# Patient Record
Sex: MALE | Race: WHITE | NOT HISPANIC OR LATINO | Employment: FULL TIME | URBAN - METROPOLITAN AREA
[De-identification: names, ages, dates, MRNs, and addresses within clinical notes are randomized per-mention and may not be internally consistent; named-entity substitution may affect disease eponyms.]

---

## 2017-07-28 ENCOUNTER — ALLSCRIPTS OFFICE VISIT (OUTPATIENT)
Dept: OTHER | Facility: OTHER | Age: 47
End: 2017-07-28

## 2017-07-28 DIAGNOSIS — Z00.00 ENCOUNTER FOR GENERAL ADULT MEDICAL EXAMINATION WITHOUT ABNORMAL FINDINGS: ICD-10-CM

## 2017-07-28 LAB
BILIRUB UR QL STRIP: NORMAL
CLARITY UR: NORMAL
COLOR UR: YELLOW
GLUCOSE (HISTORICAL): NORMAL
HGB UR QL STRIP.AUTO: NORMAL
KETONES UR STRIP-MCNC: NORMAL MG/DL
LEUKOCYTE ESTERASE UR QL STRIP: NORMAL
NITRITE UR QL STRIP: NORMAL
PH UR STRIP.AUTO: 6 [PH]
PROT UR STRIP-MCNC: NORMAL MG/DL
SP GR UR STRIP.AUTO: 1.01
UROBILINOGEN UR QL STRIP.AUTO: 0.2

## 2017-08-04 ENCOUNTER — GENERIC CONVERSION - ENCOUNTER (OUTPATIENT)
Dept: OTHER | Facility: OTHER | Age: 47
End: 2017-08-04

## 2017-08-04 ENCOUNTER — LAB CONVERSION - ENCOUNTER (OUTPATIENT)
Dept: OTHER | Facility: OTHER | Age: 47
End: 2017-08-04

## 2017-08-04 LAB
A/G RATIO (HISTORICAL): 1.6 (CALC) (ref 1–2.5)
ALBUMIN SERPL BCP-MCNC: 4.2 G/DL (ref 3.6–5.1)
ALP SERPL-CCNC: 66 U/L (ref 40–115)
ALT SERPL W P-5'-P-CCNC: 34 U/L (ref 9–46)
AST SERPL W P-5'-P-CCNC: 25 U/L (ref 10–40)
BASOPHILS # BLD AUTO: 0.8 %
BASOPHILS # BLD AUTO: 52 CELLS/UL (ref 0–200)
BILIRUB SERPL-MCNC: 0.7 MG/DL (ref 0.2–1.2)
BUN SERPL-MCNC: 21 MG/DL (ref 7–25)
BUN/CREA RATIO (HISTORICAL): NORMAL (CALC) (ref 6–22)
CALCIUM SERPL-MCNC: 9.3 MG/DL (ref 8.6–10.3)
CHLORIDE SERPL-SCNC: 104 MMOL/L (ref 98–110)
CHOLEST SERPL-MCNC: 186 MG/DL (ref 125–200)
CHOLEST/HDLC SERPL: 4.7 (CALC)
CO2 SERPL-SCNC: 26 MMOL/L (ref 20–31)
CREAT SERPL-MCNC: 1.09 MG/DL (ref 0.6–1.35)
DEPRECATED RDW RBC AUTO: 12.2 % (ref 11–15)
EGFR AFRICAN AMERICAN (HISTORICAL): 92 ML/MIN/1.73M2
EGFR-AMERICAN CALC (HISTORICAL): 79 ML/MIN/1.73M2
EOSINOPHIL # BLD AUTO: 182 CELLS/UL (ref 15–500)
EOSINOPHIL # BLD AUTO: 2.8 %
GAMMA GLOBULIN (HISTORICAL): 2.7 G/DL (CALC) (ref 1.9–3.7)
GLUCOSE (HISTORICAL): 94 MG/DL (ref 65–99)
HCT VFR BLD AUTO: 46.2 % (ref 38.5–50)
HDLC SERPL-MCNC: 40 MG/DL
HGB BLD-MCNC: 15.3 G/DL (ref 13.2–17.1)
LDL CHOLESTEROL (HISTORICAL): 97 MG/DL (CALC)
LYMPHOCYTES # BLD AUTO: 2048 CELLS/UL (ref 850–3900)
LYMPHOCYTES # BLD AUTO: 31.5 %
MCH RBC QN AUTO: 29.8 PG (ref 27–33)
MCHC RBC AUTO-ENTMCNC: 33.1 G/DL (ref 32–36)
MCV RBC AUTO: 90.1 FL (ref 80–100)
MONOCYTES # BLD AUTO: 572 CELLS/UL (ref 200–950)
MONOCYTES (HISTORICAL): 8.8 %
NEUTROPHILS # BLD AUTO: 3647 CELLS/UL (ref 1500–7800)
NEUTROPHILS # BLD AUTO: 56.1 %
NON-HDL-CHOL (CHOL-HDL) (HISTORICAL): 146 MG/DL (CALC)
PLATELET # BLD AUTO: 192 THOUSAND/UL (ref 140–400)
PMV BLD AUTO: 10.2 FL (ref 7.5–12.5)
POTASSIUM SERPL-SCNC: 4.2 MMOL/L (ref 3.5–5.3)
RBC # BLD AUTO: 5.13 MILLION/UL (ref 4.2–5.8)
SODIUM SERPL-SCNC: 139 MMOL/L (ref 135–146)
TOTAL PROTEIN (HISTORICAL): 6.9 G/DL (ref 6.1–8.1)
TRIGL SERPL-MCNC: 243 MG/DL
TSH SERPL DL<=0.05 MIU/L-ACNC: 2.79 MIU/L (ref 0.4–4.5)
WBC # BLD AUTO: 6.5 THOUSAND/UL (ref 3.8–10.8)

## 2018-01-09 NOTE — PROGRESS NOTES
Assessment    1  Encounter for preventive health examination (V70 0) (Z00 00)   2  Headache (784 0) (R51)    Plan  Headache    · Montelukast Sodium 10 MG Oral Tablet; TAKE ONE (1) TABLET(S) DAILY   · * CT HEAD WO CONTRAST; Status:Need Information - Financial Authorization; Requested for:[id number]; Health Maintenance    · (1) CBC/PLT/DIFF; Status:Active; Requested for:28Jul2017;    · (1) COMPREHENSIVE METABOLIC PANEL; Status:Active; Requested for:28Jul2017;    · (1) LIPID PANEL, FASTING; Status:Active; Requested for:28Jul2017;    · (1) TSH; Status:Active; Requested for:28Jul2017;    · Urine Dip Non-Automated- POC; Status:Complete;   Done: [id number] 07:42AM    Discussion/Summary    Well adult  Patient appears to be in stable health  He will obtain blood work for further evaluation  Headaches  Patient will follow up with his eye doctor to check his visual acuity  If headaches continue he will make an appointment to have a CAT scan of his head performed as discussed    Allergic rhinitis  Patient given prescription for a trial of Singulair 10 mg one daily  If symptoms persist patient will call the office and we will consider ENT referral       Chief Complaint  Pt is here for a well exam  Pt states that for the past couple of months he has noticed an increase in headaches  All meds/allergies reviewed with pt  History of Present Illness  HPI: I reviewed chief complaint with the patient  He describes having allergy type symptoms with nasal congestion  He is tried over-the-counter Flonase, Claritin, Allegra without relief in symptoms  He also describes headaches are sometimes increase when he is bearing down to lift weights  He is scheduled see his eye doctor next week for routine exam      Review of Systems    Constitutional: No fever or chills, feels well, no tiredness, no recent weight gain or weight loss  Eyes: No complaints of eye pain, no red eyes, no discharge from eyes, no itchy eyes     ENT: no complaints of earache, no hearing loss, no nosebleeds, no nasal discharge, no sore throat, no hoarseness  Cardiovascular: No complaints of slow heart rate, no fast heart rate, no chest pain, no palpitations, no leg claudication, no lower extremity  Respiratory: No complaints of shortness of breath, no wheezing, no cough, no SOB on exertion, no orthopnea or PND  Gastrointestinal: No complaints of abdominal pain, no constipation, no nausea or vomiting, no diarrhea or bloody stools  Genitourinary: No complaints of dysuria, no incontinence, no hesitancy, no nocturia, no genital lesion, no testicular pain  Musculoskeletal: No complaints of arthralgia, no myalgias, no joint swelling or stiffness, no limb pain or swelling  Integumentary: No complaints of skin rash or skin lesions, no itching, no skin wound, no dry skin  Neurological: headache  Active Problems    1  Rectal bleed (569 3) (K62 5)   2  Upper respiratory infection (465 9) (J06 9)    Family History  Mother    · Family history of hypercholesterolemia (V18 19) (Z80 44)  Father    · Family history of borderline diabetes mellitus (V18 0) (Z84 89)   · Family history of chronic obstructive pulmonary disease (V17 6) (Z82 5)   · Family history of hypercholesterolemia (V18 19) (Z83 42)   · Family history of hypertension (V17 49) (Z82 49)    Social History    · Never a smoker    Allergies    1  No Known Drug Allergies    Vitals   Recorded: 57Zqz1639 07:50AM Recorded: 56Yfj1151 07:40AM   Temperature  96 2 F   Heart Rate  80   Systolic 528 557   Diastolic 78 80   Height  5 ft 6 7 in   Weight  198 lb 2 oz   BMI Calculated  31 31   BSA Calculated  2 01     Physical Exam    Constitutional   General appearance: No acute distress, well appearing and well nourished  Ears, Nose, Mouth, and Throat   External inspection of ears and nose: Normal     Otoscopic examination: Tympanic membranes translucent with normal light reflex  Canals patent without erythema  Hearing: Normal     Oropharynx: Normal with no erythema, edema, exudate or lesions  Pulmonary   Respiratory effort: No increased work of breathing or signs of respiratory distress  Auscultation of lungs: Clear to auscultation  Cardiovascular   Auscultation of heart: Normal rate and rhythm, normal S1 and S2, no murmurs  Carotid pulses: 2+ bilaterally  Examination of extremities for edema and/or varicosities: Normal     Abdomen   Abdomen: Non-tender, no masses  Liver and spleen: No hepatomegaly or splenomegaly  Lymphatic   Palpation of lymph nodes in neck: No lymphadenopathy      Musculoskeletal   Gait and station: Normal        Results/Data  Urine Dip Non-Automated- POC [id number] 89:01KA Rambo Herrera     Test Name Result Flag Reference   Color Yellow     Clarity Transparent     Leukocytes -     Nitrite -     Blood -     Bilirubin -     Urobilinogen 0 2     Protein -     Ph 6 0     Specific Gravity 1 015     Ketone -     Glucose -         Signatures   Electronically signed by : JASON Chambers ; Jul 28 4736  9:56AM EST                       (Author)

## 2018-01-10 NOTE — RESULT NOTES
Verified Results  (1) COMPREHENSIVE METABOLIC PANEL [id number] 81:37SY Daja Cruz     Test Name Result Flag Reference   GLUCOSE 98 mg/dL  65-99   Fasting reference interval   UREA NITROGEN (BUN) 18 mg/dL  7-25   CREATININE 1 03 mg/dL  0 60-1 35   eGFR NON-AFR  AMERICAN 86 mL/min/1 73m2  > OR = 60   eGFR AFRICAN AMERICAN 100 mL/min/1 73m2  > OR = 60   BUN/CREATININE RATIO   4-16   NOT APPLICABLE (calc)   SODIUM 139 mmol/L  135-146   POTASSIUM 4 0 mmol/L  3 5-5 3   CHLORIDE 105 mmol/L     CARBON DIOXIDE 26 mmol/L  19-30   CALCIUM 8 8 mg/dL  8 6-10 3   PROTEIN, TOTAL 6 4 g/dL  6 1-8 1   ALBUMIN 4 0 g/dL  3 6-5 1   GLOBULIN 2 4 g/dL (calc)  1 9-3 7   ALBUMIN/GLOBULIN RATIO 1 7 (calc)  1 0-2 5   BILIRUBIN, TOTAL 1 0 mg/dL  0 2-1 2   ALKALINE PHOSPHATASE 60 U/L     AST 19 U/L  10-40   ALT 23 U/L  9-46     (1) LIPID PANEL, FASTING [id number] 93:80EB Angel Luis Mota     Test Name Result Flag Reference   CHOLESTEROL, TOTAL 165 mg/dL  125-200   HDL CHOLESTEROL 39 mg/dL L > OR = 40   TRIGLICERIDES 718 mg/dL  <150   LDL-CHOLESTEROL 101 mg/dL (calc)  <130   Desirable range <100 mg/dL for patients with CHD or  diabetes and <70 mg/dL for diabetic patients with  known heart disease  CHOL/HDLC RATIO 4 2 (calc)  < OR = 5 0   NON HDL CHOLESTEROL 126 mg/dL (calc)     Target for non-HDL cholesterol is 30 mg/dL higher than   LDL cholesterol target       (Q) TSH, 3RD GENERATION [id number] 05:84HO Angel Luis Valladares   REPORT COMMENT:  FASTING:YES     Test Name Result Flag Reference   TSH 2 41 mIU/L  0 40-4 50       Discussion/Summary   all bw looked ok for him

## 2018-01-14 VITALS
HEIGHT: 67 IN | WEIGHT: 198.13 LBS | TEMPERATURE: 96.2 F | BODY MASS INDEX: 31.1 KG/M2 | HEART RATE: 80 BPM | SYSTOLIC BLOOD PRESSURE: 120 MMHG | DIASTOLIC BLOOD PRESSURE: 78 MMHG

## 2018-01-15 NOTE — PROGRESS NOTES
Assessment    1  Encounter for preventive health examination (V70 0) (Z00 00)   2  Rectal bleed (569 3) (K62 5)    Plan  Health Maintenance    · Urine Dip Non-Automated- POC; Status:Resulted - Requires Verification;   Done:  48MAB2858 08:44AM  Problems    · (1) COMPREHENSIVE METABOLIC PANEL; Status:Active; Requested [id number]:[id number];    · (1) LIPID PANEL, FASTING; Status:Active; Requested FVD:[id number];    · (1) TSH; Status:Active; Requested [id number];   Rectal bleed    · COLONOSCOPY; Status:Active; Requested OSB:[id number];     Discussion/Summary    Well adult  Patient appears to be in stable health  He will obtain blood work as ordered  He was encouraged to have a regular form of exercise titrating up to 40 minutes 4 days a week of an aerobic activity  Rectal bleed  Patient was referred for colonoscopy for further evaluation  Chief Complaint  Pt is here for an Annual Physical  No meds  No allergies  No recent BW  History of Present Illness  HPI: Patient denies any recent illness  He states he has not been able to exercise as regularly due to his busy work  He has noticed over the past several months changes in bowel movements and occasiona right red blood with bowel movements  Denies any pains  Review of Systems    Constitutional: No fever or chills, feels well, no tiredness, no recent weight gain or weight loss  Eyes: No complaints of eye pain, no red eyes, no discharge from eyes, no itchy eyes  ENT: no complaints of earache, no hearing loss, no nosebleeds, no nasal discharge, no sore throat, no hoarseness  Cardiovascular: No complaints of slow heart rate, no fast heart rate, no chest pain, no palpitations, no leg claudication, no lower extremity  Respiratory: No complaints of shortness of breath, no wheezing, no cough, no SOB on exertion, no orthopnea or PND  Gastrointestinal: as noted in HPI     Genitourinary: No complaints of dysuria, no incontinence, no hesitancy, no nocturia, no genital lesion, no testicular pain  Musculoskeletal: No complaints of arthralgia, no myalgias, no joint swelling or stiffness, no limb pain or swelling  Integumentary: No complaints of skin rash or skin lesions, no itching, no skin wound, no dry skin  Neurological: No compliants of headache, no confusion, no convulsions, no numbness or tingling, no dizziness or fainting, no limb weakness, no difficulty walking  Vitals   Recorded: 16Jun2016 08:48AM Recorded: 46OAD0859 08:36AM   Temperature  96 5 F   Heart Rate  78   Respiration  16   Systolic 329 880   Diastolic 90 82   Height  5 ft 6 7 in   Weight  197 lb 8 0 oz   BMI Calculated  31 21   BSA Calculated  2     Physical Exam    Constitutional   General appearance: No acute distress, well appearing and well nourished  Ears, Nose, Mouth, and Throat   External inspection of ears and nose: Normal     Otoscopic examination: Tympanic membranes translucent with normal light reflex  Canals patent without erythema  Hearing: Normal     Oropharynx: Normal with no erythema, edema, exudate or lesions  Pulmonary   Respiratory effort: No increased work of breathing or signs of respiratory distress  Auscultation of lungs: Clear to auscultation  Cardiovascular   Auscultation of heart: Normal rate and rhythm, normal S1 and S2, no murmurs  Carotid pulses: 2+ bilaterally  Examination of extremities for edema and/or varicosities: Normal     Abdomen   Abdomen: Non-tender, no masses  Liver and spleen: No hepatomegaly or splenomegaly  Lymphatic   Palpation of lymph nodes in neck: No lymphadenopathy      Musculoskeletal   Gait and station: Normal        Signatures   Electronically signed by : JASON Curiel ; Jun 16 2341  9:01AM EST                       (Author)

## 2018-01-16 NOTE — RESULT NOTES
Discussion/Summary   all bw stable for him     Verified Results  (1) CBC/PLT/DIFF [id number] 22:80HJ Yessy Guzman     Test Name Result Flag Reference   WHITE BLOOD CELL COUNT 6 5 Thousand/uL  3 8-10 8   RED BLOOD CELL COUNT 5 13 Million/uL  4 20-5 80   HEMOGLOBIN 15 3 g/dL  13 2-17 1   HEMATOCRIT 46 2 %  38 5-50 0   MCV 90 1 fL  80 0-100 0   MCH 29 8 pg  27 0-33 0   MCHC 33 1 g/dL  32 0-36 0   RDW 12 2 %  11 0-15 0   PLATELET COUNT 747 Thousand/uL  140-400   ABSOLUTE NEUTROPHILS 3647 cells/uL  3176-4787   ABSOLUTE LYMPHOCYTES 2048 cells/uL  850-3900   ABSOLUTE MONOCYTES 572 cells/uL  200-950   ABSOLUTE EOSINOPHILS 182 cells/uL     ABSOLUTE BASOPHILS 52 cells/uL  0-200   NEUTROPHILS 56 1 %     LYMPHOCYTES 31 5 %     MONOCYTES 8 8 %     EOSINOPHILS 2 8 %     BASOPHILS 0 8 %     MPV 10 2 fL  7 5-12 5     (1) COMPREHENSIVE METABOLIC PANEL [id number] 26:07RV Yessy Guzman     Test Name Result Flag Reference   GLUCOSE 94 mg/dL  65-99   Fasting reference interval   UREA NITROGEN (BUN) 21 mg/dL  7-25   CREATININE 1 09 mg/dL  0 60-1 35   eGFR NON-AFR   AMERICAN 79 mL/min/1 73m2  > OR = 60   eGFR AFRICAN AMERICAN 92 mL/min/1 73m2  > OR = 60   BUN/CREATININE RATIO   7-91   NOT APPLICABLE (calc)   SODIUM 139 mmol/L  135-146   POTASSIUM 4 2 mmol/L  3 5-5 3   CHLORIDE 104 mmol/L     CARBON DIOXIDE 26 mmol/L  20-31   CALCIUM 9 3 mg/dL  8 6-10 3   PROTEIN, TOTAL 6 9 g/dL  6 1-8 1   ALBUMIN 4 2 g/dL  3 6-5 1   GLOBULIN 2 7 g/dL (calc)  1 9-3 7   ALBUMIN/GLOBULIN RATIO 1 6 (calc)  1 0-2 5   BILIRUBIN, TOTAL 0 7 mg/dL  0 2-1 2   ALKALINE PHOSPHATASE 66 U/L     AST 25 U/L  10-40   ALT 34 U/L  9-46     (1) LIPID PANEL, FASTING [id number] 00:30GF Angel Luis Morrison     Test Name Result Flag Reference   CHOLESTEROL, TOTAL 186 mg/dL  125-200   HDL CHOLESTEROL 40 mg/dL  > OR = 40   TRIGLICERIDES 675 mg/dL H <150   LDL-CHOLESTEROL 97 mg/dL (calc)  <130   Desirable range <100 mg/dL for patients with CHD or  diabetes and <70 mg/dL for diabetic patients with  known heart disease  CHOL/HDLC RATIO 4 7 (calc)  < OR = 5 0   NON HDL CHOLESTEROL 146 mg/dL (calc)     Target for non-HDL cholesterol is 30 mg/dL higher than   LDL cholesterol target       (Q) TSH, 3RD GENERATION [id number] 11:60XJ Socorro Sy   REPORT COMMENT:  FASTING:YES     Test Name Result Flag Reference   TSH 2 79 mIU/L  0 40-4 50

## 2018-08-10 DIAGNOSIS — J45.909 UNCOMPLICATED ASTHMA, UNSPECIFIED ASTHMA SEVERITY, UNSPECIFIED WHETHER PERSISTENT: Primary | ICD-10-CM

## 2018-08-10 RX ORDER — MONTELUKAST SODIUM 10 MG/1
TABLET ORAL
Qty: 30 TABLET | Refills: 5 | Status: SHIPPED | OUTPATIENT
Start: 2018-08-10 | End: 2019-02-09 | Stop reason: SDUPTHER

## 2018-12-14 ENCOUNTER — OFFICE VISIT (OUTPATIENT)
Dept: FAMILY MEDICINE CLINIC | Facility: CLINIC | Age: 48
End: 2018-12-14
Payer: COMMERCIAL

## 2018-12-14 VITALS
SYSTOLIC BLOOD PRESSURE: 112 MMHG | TEMPERATURE: 97 F | WEIGHT: 182.8 LBS | HEIGHT: 67 IN | BODY MASS INDEX: 28.69 KG/M2 | DIASTOLIC BLOOD PRESSURE: 82 MMHG | RESPIRATION RATE: 14 BRPM | HEART RATE: 64 BPM

## 2018-12-14 DIAGNOSIS — J30.9 ALLERGIC RHINITIS, UNSPECIFIED SEASONALITY, UNSPECIFIED TRIGGER: ICD-10-CM

## 2018-12-14 DIAGNOSIS — R35.1 NOCTURIA: ICD-10-CM

## 2018-12-14 DIAGNOSIS — Z00.00 ENCOUNTER FOR ANNUAL PHYSICAL EXAM: Primary | ICD-10-CM

## 2018-12-14 LAB
SL AMB  POCT GLUCOSE, UA: NEGATIVE
SL AMB LEUKOCYTE ESTERASE,UA: NEGATIVE
SL AMB POCT BILIRUBIN,UA: NEGATIVE
SL AMB POCT BLOOD,UA: NEGATIVE
SL AMB POCT CLARITY,UA: CLEAR
SL AMB POCT COLOR,UA: YELLOW
SL AMB POCT KETONES,UA: NEGATIVE
SL AMB POCT NITRITE,UA: NEGATIVE
SL AMB POCT PH,UA: 5
SL AMB POCT SPECIFIC GRAVITY,UA: 1.01
SL AMB POCT URINE PROTEIN: NEGATIVE
SL AMB POCT UROBILINOGEN: 0.2

## 2018-12-14 PROCEDURE — 99396 PREV VISIT EST AGE 40-64: CPT | Performed by: FAMILY MEDICINE

## 2018-12-14 PROCEDURE — 81002 URINALYSIS NONAUTO W/O SCOPE: CPT | Performed by: FAMILY MEDICINE

## 2018-12-14 RX ORDER — FLUTICASONE PROPIONATE 50 MCG
1 SPRAY, SUSPENSION (ML) NASAL DAILY
Qty: 16 G | Refills: 5 | Status: SHIPPED | OUTPATIENT
Start: 2018-12-14 | End: 2019-07-03 | Stop reason: ALTCHOICE

## 2018-12-14 NOTE — PROGRESS NOTES
FAMILY PRACTICE OFFICE VISIT       NAME: Tray Shah  AGE: 46 y o  SEX: male       : 1970        MRN: [id number]    DATE: 2018  TIME: 3:58 PM    Assessment and Plan     Problem List Items Addressed This Visit     Encounter for annual physical exam - Primary    Relevant Orders    POCT urine dip (Completed)    TSH, 3rd generation    CBC    Comprehensive metabolic panel    Lipid panel    Nocturia    Relevant Orders    PSA, Total Screen    Allergic rhinitis    Relevant Medications    fluticasone (FLONASE) 50 mcg/act nasal spray        Overall patient appears to be in stable health  He will obtain blood work as ordered for further evaluation  He was given referral to have screening colonoscopy with Dr Araceli Morgan  He was given prescription for Flonase nasal spray to use 2 sprays each nostril once daily in addition to his Singulair  If this does not significantly improve his breathing and his sleep quality he was given referral to [facility] for further evaluation  We will make further recommendations pending results of test     There are no Patient Instructions on file for this visit  Chief Complaint     Chief Complaint   Patient presents with    Physical Exam       History of Present Illness     Patient is here for annual wellness exam   He exercises 5 days a week  He has lost 20 lb in the last year with diet and exercise  He does describe noticing increased episodes of feeling excessive fatigue while driving an hour commute to in from work or even when speaking to his wife  Sleeps from 10:00 p m  Until 5:30 a m  He does describe restless sleep either by going to the bathroom or having episodes of not breathing easily to his nose despite the use of his Singulair on a daily basis  He does snore on a few occasions but his wife has not witnessed any apneic episodes  He is a nonsmoker    Patient refused annual flu vaccine        Review of Systems   Review of Systems Constitutional: Positive for fatigue  HENT:        As per HPI   Respiratory: Negative  Cardiovascular: Negative  Gastrointestinal: Negative  Genitourinary: Negative  Musculoskeletal: Negative  Skin: Negative  Neurological: Negative  Psychiatric/Behavioral: Positive for sleep disturbance  Active Problem List     Patient Active Problem List   Diagnosis    Encounter for annual physical exam    Nocturia    Allergic rhinitis       Past Medical History:  No past medical history on file  Past Surgical History:  No past surgical history on file  Family History:  Family History   Problem Relation Age of Onset    Hyperlipidemia Mother     Diabetes Father     COPD Father     Hyperlipidemia Father     Hypertension Father        Social History:  Social History     Social History    Marital status: Single     Spouse name: N/A    Number of children: N/A    Years of education: N/A     Occupational History    Not on file  Social History Main Topics    Smoking status: Never Smoker    Smokeless tobacco: Never Used    Alcohol use Yes      Comment: rare    Drug use: No    Sexual activity: Not on file     Other Topics Concern    Not on file     Social History Narrative    No narrative on file       Objective     Vitals:    12/14/18 1522   BP: 112/82   Pulse: 64   Resp: 14   Temp: (!) 97 °F (36 1 °C)     Wt Readings from Last 3 Encounters:   12/14/18 82 9 kg (182 lb 12 8 oz)   07/28/17 89 9 kg (198 lb 2 1 oz)   12/15/16 91 8 kg (202 lb 6 1 oz)       Physical Exam   Constitutional: He is oriented to person, place, and time  No distress  HENT:   Mouth/Throat: Oropharynx is clear and moist  No oropharyngeal exudate  Tympanic membranes within normal limits bilaterally    Increased mucosal swelling of nasal passages right greater than left   Neck:   No carotid bruit   Cardiovascular:   Regular rate and rhythm with no murmurs   Pulmonary/Chest:   Lungs are clear to auscultation without wheezes,rales, or rhonchi   Abdominal:   Abdomen is soft, nontender with positive bowel sounds  There is no rebound or guarding  No masses palpated   Musculoskeletal: He exhibits no edema  Lymphadenopathy:     He has no cervical adenopathy  Neurological: He is alert and oriented to person, place, and time  No cranial nerve deficit  Psychiatric: He has a normal mood and affect   His behavior is normal  Judgment and thought content normal        Pertinent Laboratory/Diagnostic Studies:  Lab Results   Component Value Date    BUN 21 08/03/2017    CREATININE 1 09 08/03/2017    CALCIUM 9 3 08/03/2017     08/03/2017    K 4 2 08/03/2017    CO2 26 08/03/2017     08/03/2017     Lab Results   Component Value Date    ALT 34 08/03/2017    AST 25 08/03/2017    ALKPHOS 66 08/03/2017    BILITOT 0 7 08/03/2017       Lab Results   Component Value Date    WBC 6 5 08/03/2017    HGB 15 3 08/03/2017    HCT 46 2 08/03/2017    MCV 90 1 08/03/2017     08/03/2017       No results found for: TSH    Lab Results   Component Value Date    CHOL 186 08/03/2017     Lab Results   Component Value Date    TRIG 243 (H) 08/03/2017     Lab Results   Component Value Date    HDL 40 08/03/2017     No results found for: LDLCALC  No results found for: HGBA1C    Results for orders placed or performed in visit on 12/14/18   POCT urine dip   Result Value Ref Range    LEUKOCYTE ESTERASE,UA negative     NITRITE,UA negative     SL AMB POCT UROBILINOGEN 0 2     POCT URINE PROTEIN negative      PH,UA 5 0     BLOOD,UA negative     SPECIFIC GRAVITY,UA 1 010     KETONES,UA negative     BILIRUBIN,UA negative     GLUCOSE, UA negative      COLOR,UA yellow     CLARITY,UA clear        Orders Placed This Encounter   Procedures    TSH, 3rd generation    PSA, Total Screen    CBC    Comprehensive metabolic panel    Lipid panel    POCT urine dip       ALLERGIES:  No Known Allergies    Current Medications     Current Outpatient Prescriptions Medication Sig Dispense Refill    montelukast (SINGULAIR) 10 mg tablet TAKE ONE (1) TABLET(S) DAILY 30 tablet 5    fluticasone (FLONASE) 50 mcg/act nasal spray 1 spray into each nostril daily 16 g 5     No current facility-administered medications for this visit  Health Maintenance     Health Maintenance   Topic Date Due    Depression Screening PHQ  1970    CRC Screening: Colonoscopy  1970    DTaP,Tdap,and Td Vaccines (1 - Tdap) 07/15/1989    INFLUENZA VACCINE  07/01/2018     There is no immunization history for the selected administration types on file for this patient      Manuel Gracia MD

## 2018-12-20 ENCOUNTER — TELEPHONE (OUTPATIENT)
Dept: FAMILY MEDICINE CLINIC | Facility: CLINIC | Age: 48
End: 2018-12-20

## 2018-12-20 LAB
ALBUMIN SERPL-MCNC: 4 G/DL (ref 3.6–5.1)
ALBUMIN/GLOB SERPL: 1.6 (CALC) (ref 1–2.5)
ALP SERPL-CCNC: 66 U/L (ref 40–115)
ALT SERPL-CCNC: 17 U/L (ref 9–46)
AST SERPL-CCNC: 18 U/L (ref 10–35)
BASOPHILS # BLD AUTO: 38 CELLS/UL (ref 0–200)
BASOPHILS NFR BLD AUTO: 0.7 %
BILIRUB SERPL-MCNC: 0.9 MG/DL (ref 0.2–1.2)
BUN SERPL-MCNC: 26 MG/DL (ref 7–25)
BUN/CREAT SERPL: 22 (CALC) (ref 6–22)
CALCIUM SERPL-MCNC: 8.3 MG/DL (ref 8.6–10.3)
CHLORIDE SERPL-SCNC: 104 MMOL/L (ref 98–110)
CHOLEST SERPL-MCNC: 184 MG/DL
CHOLEST/HDLC SERPL: 4 (CALC)
CO2 SERPL-SCNC: 30 MMOL/L (ref 20–32)
CREAT SERPL-MCNC: 1.17 MG/DL (ref 0.7–1.33)
EOSINOPHIL # BLD AUTO: 119 CELLS/UL (ref 15–500)
EOSINOPHIL NFR BLD AUTO: 2.2 %
ERYTHROCYTE [DISTWIDTH] IN BLOOD BY AUTOMATED COUNT: 11.8 % (ref 11–15)
GLOBULIN SER CALC-MCNC: 2.5 G/DL (CALC) (ref 1.9–3.7)
GLUCOSE SERPL-MCNC: 84 MG/DL (ref 65–99)
HCT VFR BLD AUTO: 45 % (ref 38.5–50)
HDLC SERPL-MCNC: 46 MG/DL
HGB BLD-MCNC: 15.1 G/DL (ref 13.2–17.1)
LDLC SERPL CALC-MCNC: 118 MG/DL (CALC)
LYMPHOCYTES # BLD AUTO: 2052 CELLS/UL (ref 850–3900)
LYMPHOCYTES NFR BLD AUTO: 38 %
MCH RBC QN AUTO: 31.2 PG (ref 27–33)
MCHC RBC AUTO-ENTMCNC: 33.6 G/DL (ref 32–36)
MCV RBC AUTO: 93 FL (ref 80–100)
MONOCYTES # BLD AUTO: 389 CELLS/UL (ref 200–950)
MONOCYTES NFR BLD AUTO: 7.2 %
NEUTROPHILS # BLD AUTO: 2803 CELLS/UL (ref 1500–7800)
NEUTROPHILS NFR BLD AUTO: 51.9 %
NONHDLC SERPL-MCNC: 138 MG/DL (CALC)
PLATELET # BLD AUTO: 194 THOUSAND/UL (ref 140–400)
PMV BLD REES-ECKER: 10.5 FL (ref 7.5–12.5)
POTASSIUM SERPL-SCNC: 4.4 MMOL/L (ref 3.5–5.3)
PROT SERPL-MCNC: 6.5 G/DL (ref 6.1–8.1)
PSA SERPL-MCNC: 0.2 NG/ML
RBC # BLD AUTO: 4.84 MILLION/UL (ref 4.2–5.8)
SL AMB EGFR AFRICAN AMERICAN: 84 ML/MIN/1.73M2
SL AMB EGFR NON AFRICAN AMERICAN: 72 ML/MIN/1.73M2
SODIUM SERPL-SCNC: 139 MMOL/L (ref 135–146)
TRIGL SERPL-MCNC: 97 MG/DL
TSH SERPL-ACNC: 2.99 MIU/L (ref 0.4–4.5)
WBC # BLD AUTO: 5.4 THOUSAND/UL (ref 3.8–10.8)

## 2018-12-20 NOTE — TELEPHONE ENCOUNTER
----- Message from Zakiya Marin MD sent at [phone]  7:27 AM EST -----  All recent blood test stable for patient

## 2019-02-09 DIAGNOSIS — J45.909 UNCOMPLICATED ASTHMA, UNSPECIFIED ASTHMA SEVERITY, UNSPECIFIED WHETHER PERSISTENT: ICD-10-CM

## 2019-02-10 RX ORDER — MONTELUKAST SODIUM 10 MG/1
TABLET ORAL
Qty: 30 TABLET | Refills: 5 | Status: SHIPPED | OUTPATIENT
Start: 2019-02-10 | End: 2019-08-08 | Stop reason: SDUPTHER

## 2019-03-09 DIAGNOSIS — J31.0 CHRONIC RHINITIS: Primary | ICD-10-CM

## 2019-03-11 RX ORDER — IPRATROPIUM BROMIDE 21 UG/1
SPRAY, METERED NASAL
Qty: 30 ML | Refills: 4 | Status: SHIPPED | OUTPATIENT
Start: 2019-03-11 | End: 2019-08-26 | Stop reason: SDUPTHER

## 2019-07-03 ENCOUNTER — APPOINTMENT (OUTPATIENT)
Dept: RADIOLOGY | Facility: CLINIC | Age: 49
End: 2019-07-03
Payer: COMMERCIAL

## 2019-07-03 ENCOUNTER — OFFICE VISIT (OUTPATIENT)
Dept: URGENT CARE | Facility: CLINIC | Age: 49
End: 2019-07-03
Payer: COMMERCIAL

## 2019-07-03 VITALS
RESPIRATION RATE: 16 BRPM | BODY MASS INDEX: 27.89 KG/M2 | WEIGHT: 184 LBS | HEIGHT: 68 IN | HEART RATE: 63 BPM | OXYGEN SATURATION: 98 % | TEMPERATURE: 97.1 F | SYSTOLIC BLOOD PRESSURE: 107 MMHG | DIASTOLIC BLOOD PRESSURE: 57 MMHG

## 2019-07-03 DIAGNOSIS — S69.92XA FINGER INJURY, LEFT, INITIAL ENCOUNTER: ICD-10-CM

## 2019-07-03 DIAGNOSIS — M20.012 MALLET DEFORMITY OF LEFT RING FINGER: Primary | ICD-10-CM

## 2019-07-03 PROCEDURE — 73140 X-RAY EXAM OF FINGER(S): CPT

## 2019-07-03 PROCEDURE — G0382 LEV 3 HOSP TYPE B ED VISIT: HCPCS | Performed by: NURSE PRACTITIONER

## 2019-07-03 NOTE — PATIENT INSTRUCTIONS
We saw you today for a finger dislocation and possible fracture  Because the injury occurred 2 weeks ago, we will need you to see an orthopedic specialist for follow-up  We have made an appointment for you today

## 2019-07-22 ENCOUNTER — OFFICE VISIT (OUTPATIENT)
Dept: OCCUPATIONAL THERAPY | Facility: CLINIC | Age: 49
End: 2019-07-22
Payer: COMMERCIAL

## 2019-07-22 VITALS
HEIGHT: 68 IN | DIASTOLIC BLOOD PRESSURE: 75 MMHG | HEART RATE: 65 BPM | BODY MASS INDEX: 27.92 KG/M2 | SYSTOLIC BLOOD PRESSURE: 121 MMHG | WEIGHT: 184.2 LBS

## 2019-07-22 DIAGNOSIS — M20.012 MALLET FINGER OF LEFT FINGER(S): Primary | ICD-10-CM

## 2019-07-22 DIAGNOSIS — M20.012 MALLET FINGER OF LEFT FINGER(S): ICD-10-CM

## 2019-07-22 PROCEDURE — 97760 ORTHOTIC MGMT&TRAING 1ST ENC: CPT | Performed by: OCCUPATIONAL THERAPIST

## 2019-07-22 PROCEDURE — 99203 OFFICE O/P NEW LOW 30 MIN: CPT | Performed by: ORTHOPAEDIC SURGERY

## 2019-07-22 NOTE — PROGRESS NOTES
Splint    Diagnosis:   1  Mallet finger of left finger(s)  Ambulatory referral to PT/OT hand therapy      Indication: Fracture    Location: Left  ring finger  Supplies: Orthotics  Thermoplastic material    Splint type: Stax splint  Wearing Schedule: Remove for hygiene only  Describe Position: DIP extension    Precautions: Fracture    Patient or Caregiver expresses understanding of wearing Schedule and Precautions? Yes  Patient or Caregiver able to don/doff orthotic independently? Yes    Written orders provided to patient?  Yes  Orders Obtained: Written  Orders Obtained from: Carolina Peng

## 2019-07-22 NOTE — PATIENT INSTRUCTIONS
What is Mallet Finger? A mallet finger is a deformity of the finger caused when the tendon that straightens your finger (the extensor tendon) is damaged  When a ball or other object strikes the tip of the finger or thumb and forcibly bends it, the force tears the tendon that straightens the finger (see Figure 1a and 1b)  The force of the blow may even pull away a piece of bone along with the tendon (see Figure 2)  The tip of the finger or thumb no longer straightens  This condition is sometimes referred to as baseball finger  Symptoms  In a mallet finger, the fingertip droops: it cannot straighten on its own power  The finger may be painful, swollen and bruised, especially if there is an associated fracture, but often the only finding is the inability to straighten the tip  Occasionally, blood collects beneath the nail  The nail can even become detached from beneath the skin fold at the base of the nail  Diagnosis  The diagnosis is evident by the appearance of the finger  Doctors will often order x-rays to see if a piece of bone pulled away and to make sure the joint is aligned  Nonsurgical Treatment  The majority of mallet finger injuries can be treated without surgery  Ice should be applied immediately and the hand should be elevated (fingers toward the ceiling ) Medical attention should be sought within a week after injury  It is especially important to seek immediate attention if there is blood beneath the nail or if the nail is detached  This may be a sign of a nail bed laceration or an open (compound) fracture  There are many different types of splints/casts for mallet fingers  The goal is to keep the fingertip straight until the tendon heals  Most of the time, a splint will be worn full-time for eight weeks (see Figure 3)  Over the next three to four weeks, most patients gradually begin to wear the splint less frequently   The finger usually regains acceptable function and appearance with this treatment  Nevertheless, it is not unusual to lack some extension at the conclusion of treatment  Your surgeon or hand therapist will instruct you about how to wear the splint and will also show you exercises to maintain motion in the middle joint (the proximal interphalangeal joint) so your finger does not become stiff  Once your mallet finger has healed, your surgeon or hand therapist will teach you exercises to regain motion in the fingertip  In children, mallet finger injuries may involve the cartilage that controls bone growth  The doctor must carefully evaluate and treat this injury in children so that the finger does not become stunted or deformed  Surgical Treatment  Surgical repair may be considered when mallet finger injuries have large bone fragments or joint mal-alignment  In these cases, pins, wires or even small screws are used to secure the bone fragment and realign the joint (see Figure 4)  Surgery may also be considered if splint wear is not feasible or if non-surgical treatment is not successful in restoring adequate finger extension  Surgical treatment of the damaged tendon can include tightening the stretched tendon tissue, using tendon grafts or even fusing the joint straight  Your surgeon will advise you on the best technique in your situation  © [employer]  www handcare  org

## 2019-07-22 NOTE — PROGRESS NOTES
ASSESSMENT/PLAN:    Assessment:   Left ring mallet finger    Plan:   Stax splint vs surgery d/w pt today  Patient elects splinting x 8 weeks  Educated that he must wear this splint at all times and proper drying technique shown to pt today    Follow Up:  8  week(s)    To Do Next Visit:  Stax splint at night x 6 weeks if successful    General Discussions:  Mallet Finger: The anatomy and physiology of a mallet finger was discussed with the patient today  Typically, the extensor tendon is torn off of the dorsal aspect of the distal phalanx  This results in flexion of the distal interphalangeal joint, with incomplete extension  Typically, and less the joint is subluxed, this is treated through conservative measures  An extension block splint is worn over the distal interphalangeal joint for 6 weeks continuously, followed by 6 weeks of nocturnal use  After healing, there is typically a small flexion deformity at the distal interphalangeal joint  Surgery does not typically change these results  _____________________________________________________  CHIEF COMPLAINT:  Chief Complaint   Patient presents with    Left Ring Finger - Pain         SUBJECTIVE:  Shreyas Benavides  is a 44 y o  male who presents with deformity to the L ring finger    PAST MEDICAL HISTORY:  Past Medical History:   Diagnosis Date    Known health problems: none        PAST SURGICAL HISTORY:  History reviewed  No pertinent surgical history      FAMILY HISTORY:  Family History   Problem Relation Age of Onset    Hyperlipidemia Mother     Diabetes Father     COPD Father     Hyperlipidemia Father     Hypertension Father        SOCIAL HISTORY:  Social History     Tobacco Use    Smoking status: Never Smoker    Smokeless tobacco: Never Used   Substance Use Topics    Alcohol use: Yes     Comment: rare    Drug use: No       MEDICATIONS:    Current Outpatient Medications:     ipratropium (ATROVENT) 0 03 % nasal spray, SPRAY 2 SPRAYS 2 TO 4 TIMES A DAY AS NEEDED FOR NASAL CONGESTION, Disp: 30 mL, Rfl: 4    montelukast (SINGULAIR) 10 mg tablet, TAKE ONE (1) TABLET(S) DAILY, Disp: 30 tablet, Rfl: 5    ALLERGIES:  No Known Allergies    REVIEW OF SYSTEMS:  Pertinent items are noted in HPI  A comprehensive review of systems was negative  LABS:  HgA1c: No results found for: HGBA1C  BMP:   Lab Results   Component Value Date    CALCIUM 8 3 (L) 12/19/2018     08/03/2017    K 4 4 12/19/2018    CO2 30 12/19/2018     12/19/2018    BUN 26 (H) 12/19/2018    CREATININE 1 17 12/19/2018         _____________________________________________________  PHYSICAL EXAMINATION:  Vital signs: /75   Pulse 65   Ht 5' 8" (1 727 m)   Wt 83 6 kg (184 lb 3 2 oz)   BMI 28 01 kg/m²   General: well developed and well nourished, alert, oriented times 3 and appears comfortable  Psychiatric: Normal  HEENT: Trachea Midline, No torticollis  Cardiovascular: No discernable arrhythmia  Pulmonary: No wheezing or stridor  Skin: No masses, erthema, lacerations, fluctation, ulcerations  Neurovascular: Sensation Intact to the Median, Ulnar, Radial Nerve, Motor Intact to the Median, Ulnar, Radial Nerve and Pulses Intact    MUSCULOSKELETAL EXAMINATION:  LEFT SIDE:  Finger:  Pt noted to have a mallet deformity of the ring finger  He has slight hyperextension at the PIP joint  Nontender to palpation  Pt noted to have mild edema and some scar tissue formation just proximal to the DIP joint  He is able to actively flex FDP and FDS    _____________________________________________________  STUDIES REVIEWED:  Images were reviewd in PACS: Xrays show evidence of mallet finger of L ring finger with slight hyperextension at PIP joint  No bony mallet        PROCEDURES PERFORMED:  Procedures  No Procedures performed today   Scribe Attestation    I,:   Kayla Troncoso PA-C am acting as a scribe while in the presence of the attending physician :        I,:   Seun Aguilar MD personally performed the services described in this documentation    as scribed in my presence :

## 2019-08-08 DIAGNOSIS — J45.909 UNCOMPLICATED ASTHMA, UNSPECIFIED ASTHMA SEVERITY, UNSPECIFIED WHETHER PERSISTENT: ICD-10-CM

## 2019-08-08 RX ORDER — MONTELUKAST SODIUM 10 MG/1
TABLET ORAL
Qty: 30 TABLET | Refills: 5 | Status: SHIPPED | OUTPATIENT
Start: 2019-08-08 | End: 2020-01-25

## 2019-08-26 DIAGNOSIS — J31.0 CHRONIC RHINITIS: ICD-10-CM

## 2019-08-26 RX ORDER — IPRATROPIUM BROMIDE 21 UG/1
SPRAY, METERED NASAL
Qty: 30 ML | Refills: 4 | Status: SHIPPED | OUTPATIENT
Start: 2019-08-26 | End: 2020-04-14 | Stop reason: SDUPTHER

## 2019-09-16 ENCOUNTER — OFFICE VISIT (OUTPATIENT)
Dept: OBGYN CLINIC | Facility: CLINIC | Age: 49
End: 2019-09-16
Payer: COMMERCIAL

## 2019-09-16 VITALS
BODY MASS INDEX: 28.04 KG/M2 | HEIGHT: 68 IN | SYSTOLIC BLOOD PRESSURE: 110 MMHG | WEIGHT: 185 LBS | DIASTOLIC BLOOD PRESSURE: 76 MMHG

## 2019-09-16 DIAGNOSIS — M20.012 MALLET FINGER OF LEFT FINGER(S): Primary | ICD-10-CM

## 2019-09-16 PROCEDURE — 99213 OFFICE O/P EST LOW 20 MIN: CPT | Performed by: ORTHOPAEDIC SURGERY

## 2019-09-16 NOTE — PROGRESS NOTES
ASSESSMENT/PLAN:    Assessment:   L ring mallet finger after 8 weeks of splinting    Plan:   Can transition to nocturnal splinting x 6 wks  Start to use the hand more normally throughout the day  No therapy    Follow Up:  PRN    _____________________________________________________  CHIEF COMPLAINT:  Chief Complaint   Patient presents with    Left Ring Finger - Follow-up         SUBJECTIVE:  Richard Villalobos  is a 44 y o  male who presents for follow up regarding left ring mallet finger  Patient has been wearing his splint at all times as instructed  States no pain, n/t  PAST MEDICAL HISTORY:  Past Medical History:   Diagnosis Date    Known health problems: none        PAST SURGICAL HISTORY:  History reviewed  No pertinent surgical history  FAMILY HISTORY:  Family History   Problem Relation Age of Onset    Hyperlipidemia Mother     Diabetes Father     COPD Father     Hyperlipidemia Father     Hypertension Father        SOCIAL HISTORY:  Social History     Tobacco Use    Smoking status: Never Smoker    Smokeless tobacco: Never Used   Substance Use Topics    Alcohol use: Yes     Comment: rare    Drug use: No       MEDICATIONS:    Current Outpatient Medications:     ipratropium (ATROVENT) 0 03 % nasal spray, SPRAY 2 SPRAYS 2 TO 4 TIMES A DAY AS NEEDED FOR NASAL CONGESTION, Disp: 30 mL, Rfl: 4    montelukast (SINGULAIR) 10 mg tablet, TAKE ONE (1) TABLET(S) DAILY, Disp: 30 tablet, Rfl: 5    ALLERGIES:  No Known Allergies    REVIEW OF SYSTEMS:  Pertinent items are noted in HPI  A comprehensive review of systems was negative      LABS:  HgA1c: No results found for: HGBA1C  BMP:   Lab Results   Component Value Date    CALCIUM 8 3 (L) 12/19/2018     08/03/2017    K 4 4 12/19/2018    CO2 30 12/19/2018     12/19/2018    BUN 26 (H) 12/19/2018    CREATININE 1 17 12/19/2018           _____________________________________________________  PHYSICAL EXAMINATION:  Vital signs: /76   Ht 5' 8" (1 727 m)   Wt 83 9 kg (185 lb)   BMI 28 13 kg/m²   General: well developed and well nourished, alert, oriented times 3 and appears comfortable  Psychiatric: Normal  HEENT: Trachea Midline, No torticollis  Cardiovascular: No discernable arrhythmia  Pulmonary: No wheezing or stridor  Skin: No masses, erthema, lacerations, fluctation, ulcerations  Neurovascular: Sensation Intact to the Median, Ulnar, Radial Nerve, Motor Intact to the Median, Ulnar, Radial Nerve and Pulses Intact    MUSCULOSKELETAL EXAMINATION:  LEFT SIDE:  Finger:  Some skin maceration present under the splint  The finger is straight upon splint removal  He is able to resist flexion and actively extend at the DIP joint   He can go to make a fist with expected stiffness at the DIP joint    _____________________________________________________  STUDIES REVIEWED:  No Studies to review      PROCEDURES PERFORMED:  Procedures  No Procedures performed today   Scribe Attestation    I,:   Margarito Victor PA-C am acting as a scribe while in the presence of the attending physician :        I,:   Ryann Jimenez MD personally performed the services described in this documentation    as scribed in my presence :

## 2019-10-08 ENCOUNTER — OFFICE VISIT (OUTPATIENT)
Dept: FAMILY MEDICINE CLINIC | Facility: CLINIC | Age: 49
End: 2019-10-08
Payer: COMMERCIAL

## 2019-10-08 VITALS
OXYGEN SATURATION: 98 % | WEIGHT: 186.2 LBS | RESPIRATION RATE: 18 BRPM | BODY MASS INDEX: 28.22 KG/M2 | TEMPERATURE: 97.8 F | DIASTOLIC BLOOD PRESSURE: 74 MMHG | HEIGHT: 68 IN | HEART RATE: 64 BPM | SYSTOLIC BLOOD PRESSURE: 118 MMHG

## 2019-10-08 DIAGNOSIS — M76.32 ILIOTIBIAL BAND SYNDROME OF LEFT SIDE: Primary | ICD-10-CM

## 2019-10-08 PROCEDURE — 99213 OFFICE O/P EST LOW 20 MIN: CPT | Performed by: FAMILY MEDICINE

## 2019-10-08 PROCEDURE — 3008F BODY MASS INDEX DOCD: CPT | Performed by: FAMILY MEDICINE

## 2019-10-08 RX ORDER — MELOXICAM 15 MG/1
15 TABLET ORAL DAILY
Qty: 30 TABLET | Refills: 0 | Status: SHIPPED | OUTPATIENT
Start: 2019-10-08 | End: 2019-12-27 | Stop reason: SDUPTHER

## 2019-10-08 NOTE — PROGRESS NOTES
FAMILY PRACTICE OFFICE VISIT       NAME: Joyce Flores  AGE: 44 y o  SEX: male       : 1970        MRN: [id number]    DATE: 10/8/2019  TIME: 8:00 PM    Assessment and Plan     Problem List Items Addressed This Visit        Musculoskeletal and Integument    Iliotibial band syndrome of left side - Primary     I TB syndrome  I suspect this is patient's etiology or perhaps bursitis of his hip  He was given prescription to initiate meloxicam 15 milligrams once daily  He was also referred to physical therapy for evaluation and treatment  Relevant Medications    meloxicam (MOBIC) 15 mg tablet    Other Relevant Orders    Ambulatory referral to Physical Therapy              Chief Complaint     Chief Complaint   Patient presents with    Hip Pain     left hip down leg No known injury       History of Present Illness     Patient describes symptoms since 2019 where he fairly often experiences discomfort down the left lateral side of his leg from his hip to his knee area  He denies any trauma initiating his symptoms  Symptoms are worse with doing squats with weights or lying on his left side during sleep  Review of Systems   Review of Systems   Constitutional: Negative  Musculoskeletal:        As per HPI       Active Problem List     Patient Active Problem List   Diagnosis    Encounter for annual physical exam    Nocturia    Allergic rhinitis    Mallet finger of left finger(s)    Iliotibial band syndrome of left side       Past Medical History:  Past Medical History:   Diagnosis Date    Known health problems: none        Past Surgical History:  No past surgical history on file      Family History:  Family History   Problem Relation Age of Onset    Hyperlipidemia Mother     Diabetes Father     COPD Father     Hyperlipidemia Father     Hypertension Father        Social History:  Social History     Socioeconomic History    Marital status: Single     Spouse name: Not on file  Number of children: Not on file    Years of education: Not on file    Highest education level: Not on file   Occupational History    Not on file   Social Needs    Financial resource strain: Not on file    Food insecurity:     Worry: Not on file     Inability: Not on file    Transportation needs:     Medical: Not on file     Non-medical: Not on file   Tobacco Use    Smoking status: Never Smoker    Smokeless tobacco: Never Used   Substance and Sexual Activity    Alcohol use: Yes     Comment: rare    Drug use: No    Sexual activity: Not on file   Lifestyle    Physical activity:     Days per week: Not on file     Minutes per session: Not on file    Stress: Not on file   Relationships    Social connections:     Talks on phone: Not on file     Gets together: Not on file     Attends [employer] service: Not on file     Active member of club or organization: Not on file     Attends meetings of clubs or organizations: Not on file     Relationship status: Not on file    Intimate partner violence:     Fear of current or ex partner: Not on file     Emotionally abused: Not on file     Physically abused: Not on file     Forced sexual activity: Not on file   Other Topics Concern    Not on file   Social History Narrative    Not on file       Objective     Vitals:    10/08/19 1923   BP: 118/74   Pulse: 64   Resp: 18   Temp: 97 8 °F (36 6 °C)   SpO2: 98%     Wt Readings from Last 3 Encounters:   10/08/19 84 5 kg (186 lb 3 2 oz)   09/16/19 83 9 kg (185 lb)   07/22/19 83 6 kg (184 lb 3 2 oz)       Physical Exam   Constitutional: No distress  Musculoskeletal:   Normal range of motion of hips  Increased discomfort along left lateral hip area with internal rotation and abduction of left lower extremity  Muscle strength 5/5  Negative straight leg raising  Minimal tenderness to palpation along greater trochanter area         Pertinent Laboratory/Diagnostic Studies:  Lab Results   Component Value Date    BUN 26 (H) 12/19/2018    CREATININE 1 17 12/19/2018    CALCIUM 8 3 (L) 12/19/2018     08/03/2017    K 4 4 12/19/2018    CO2 30 12/19/2018     12/19/2018     Lab Results   Component Value Date    ALT 17 12/19/2018    AST 18 12/19/2018    ALKPHOS 66 12/19/2018    BILITOT 0 7 08/03/2017       Lab Results   Component Value Date    WBC 5 4 12/19/2018    HGB 15 1 12/19/2018    HCT 45 0 12/19/2018    MCV 93 0 12/19/2018     12/19/2018       Lab Results   Component Value Date    TSH 2 99 12/19/2018       Lab Results   Component Value Date    CHOL 186 08/03/2017     Lab Results   Component Value Date    TRIG 97 12/19/2018     Lab Results   Component Value Date    HDL 46 12/19/2018     No results found for: LDLCALC  No results found for: HGBA1C    Results for orders placed or performed in visit on 12/19/18   Lipid panel   Result Value Ref Range    Total Cholesterol 184 <200 mg/dL    HDL 46 >40 mg/dL    Triglycerides 97 <150 mg/dL    LDL Direct 118 (H) mg/dL (calc)    Chol HDLC Ratio 4 0 <5 0 (calc)    Non-HDL Cholesterol 138 (H) <130 mg/dL (calc)   Comprehensive metabolic panel   Result Value Ref Range    Glucose, Random 84 65 - 99 mg/dL    BUN 26 (H) 7 - 25 mg/dL    Creatinine 1 17 0 70 - 1 33 mg/dL    eGFR Non  72 > OR = 60 mL/min/1 73m2    eGFR  84 > OR = 60 mL/min/1 73m2    SL AMB BUN/CREATININE RATIO 22 6 - 22 (calc)    Sodium 139 135 - 146 mmol/L    Potassium 4 4 3 5 - 5 3 mmol/L    Chloride 104 98 - 110 mmol/L    CO2 30 20 - 32 mmol/L    SL AMB CALCIUM 8 3 (L) 8 6 - 10 3 mg/dL    Protein, Total 6 5 6 1 - 8 1 g/dL    Albumin 4 0 3 6 - 5 1 g/dL    Globulin 2 5 1 9 - 3 7 g/dL (calc)    Albumin/Globulin Ratio 1 6 1 0 - 2 5 (calc)    TOTAL BILIRUBIN 0 9 0 2 - 1 2 mg/dL    Alkaline Phosphatase 66 40 - 115 U/L    AST 18 10 - 35 U/L    ALT 17 9 - 46 U/L   CBC and differential   Result Value Ref Range    White Blood Cell Count 5 4 3 8 - 10 8 Thousand/uL    Red Blood Cell Count 4 84 4 20 - 5 80 Million/uL    Hemoglobin 15 1 13 2 - 17 1 g/dL    HCT 45 0 38 5 - 50 0 %    MCV 93 0 80 0 - 100 0 fL    MCH 31 2 27 0 - 33 0 pg    MCHC 33 6 32 0 - 36 0 g/dL    RDW 11 8 11 0 - 15 0 %    Platelet Count 911 918 - 400 Thousand/uL    SL AMB MPV 10 5 7 5 - 12 5 fL    Neutrophils (Absolute) 2,803 1,500 - 7,800 cells/uL    Lymphocytes (Absolute) 2,052 850 - 3,900 cells/uL    Monocytes (Absolute) 389 200 - 950 cells/uL    Eosinophils (Absolute) 119 15 - 500 cells/uL    Basophils ABS 38 0 - 200 cells/uL    Neutrophils 51 9 %    Lymphocytes 38 0 %    Monocytes 7 2 %    Eosinophils 2 2 %    Basophils PCT 0 7 %   TSH, 3rd generation   Result Value Ref Range    TSH 2 99 0 40 - 4 50 mIU/L   PSA, Total Screen   Result Value Ref Range    Prostate Specific Antigen Total 0 2 < OR = 4 0 ng/mL       Orders Placed This Encounter   Procedures    Ambulatory referral to Physical Therapy       ALLERGIES:  No Known Allergies    Current Medications     Current Outpatient Medications   Medication Sig Dispense Refill    ipratropium (ATROVENT) 0 03 % nasal spray SPRAY 2 SPRAYS 2 TO 4 TIMES A DAY AS NEEDED FOR NASAL CONGESTION 30 mL 4    montelukast (SINGULAIR) 10 mg tablet TAKE ONE (1) TABLET(S) DAILY 30 tablet 5    meloxicam (MOBIC) 15 mg tablet Take 1 tablet (15 mg total) by mouth daily 30 tablet 0     No current facility-administered medications for this visit            Health Maintenance     Health Maintenance   Topic Date Due    OT PLAN OF CARE  1970    BMI: Followup Plan  07/15/1986    DTaP,Tdap,and Td Vaccines (1 - Tdap) 07/15/1989    INFLUENZA VACCINE  07/01/2019    Depression Screening PHQ  07/03/2020    BMI: Adult  09/16/2020    CRC Screening: Colonoscopy  02/25/2029    Pneumococcal Vaccine: 65+ Years (1 of 2 - PCV13) 07/15/2033    Pneumococcal Vaccine: Pediatrics (0 to 5 Years) and At-Risk Patients (6 to 59 Years)  Aged Out    HEPATITIS B VACCINES  Aged Out     There is no immunization history for the selected administration types on file for this patient      Sunday MD Susy

## 2019-10-09 NOTE — ASSESSMENT & PLAN NOTE
I TB syndrome  I suspect this is patient's etiology or perhaps bursitis of his hip  He was given prescription to initiate meloxicam 15 milligrams once daily  He was also referred to physical therapy for evaluation and treatment

## 2019-10-28 ENCOUNTER — EVALUATION (OUTPATIENT)
Dept: PHYSICAL THERAPY | Facility: CLINIC | Age: 49
End: 2019-10-28
Payer: COMMERCIAL

## 2019-10-28 DIAGNOSIS — M76.32 ILIOTIBIAL BAND SYNDROME OF LEFT SIDE: Primary | ICD-10-CM

## 2019-10-28 PROCEDURE — 97161 PT EVAL LOW COMPLEX 20 MIN: CPT | Performed by: PHYSICAL THERAPIST

## 2019-10-28 PROCEDURE — 97140 MANUAL THERAPY 1/> REGIONS: CPT | Performed by: PHYSICAL THERAPIST

## 2019-10-28 NOTE — PROGRESS NOTES
PT Evaluation     Today's date: 10/28/2019  Patient name: Michael Reid  : 1970  MRN: [id number]  Referring provider: Kerry Fernandez MD  Dx:   Encounter Diagnosis     ICD-10-CM    1  Iliotibial band syndrome of left side M76 32 Ambulatory referral to Physical Therapy                  Assessment  Assessment details: Patient is a 44 y o  male who presents to outpatient PT with pain, decreased rom, decreased strength and decreased functional mobility  He will benefit from skilled PT to address these deficits in order to achieve his goals and maximize his functional mobility  Goals  Short Term Goals: Independent performance of initial hep  Decrease pain 2 points on VAS      Long Term Goals: Independent performance of comprehensive hep  Work performance is returned to max level of function  Performance of IADL's is returned to max level of function  Performance in recreational activities is improved to max level of function  Able to run painfree    Plan  Planned therapy interventions: IADL retraining, joint mobilization, abdominal trunk stabilization, strengthening, stretching, therapeutic activities, therapeutic exercise, therapeutic training and home exercise program  Frequency: 2x week  Duration in weeks: 6        Subjective Evaluation    History of Present Illness  Mechanism of injury: Pt reports that he has been having hip pain for approx 1year, insidious onset  Takes Meloxicam with good pain reduction  Reports that he has pain with running, sitting for extended periods of time  Reports that he has stopped running  Reports that he has pain when laying on his side       History of ACLR  Pain  Current pain ratin  At best pain ratin  At worst pain ratin    Patient Goals  Patient goals for therapy: decreased pain, increased motion, increased strength, improved balance, independence with ADLs/IADLs and return to sport/leisure activities          Objective Precautions: none        Manual  10/28            may RICE 8'                                                                    Exercise Diary              bike             Piriformis stretch             Hs stretch             Gluteal stretch             ITB stretch             Short foot             Prone hip ir/er             clamshells             Reverse clamshells             Progress to abd PRE  s as pain improves                                                                                                                                                       Modalities

## 2019-10-30 ENCOUNTER — OFFICE VISIT (OUTPATIENT)
Dept: PHYSICAL THERAPY | Facility: CLINIC | Age: 49
End: 2019-10-30
Payer: COMMERCIAL

## 2019-10-30 DIAGNOSIS — M76.32 ILIOTIBIAL BAND SYNDROME OF LEFT SIDE: Primary | ICD-10-CM

## 2019-10-30 PROCEDURE — 97140 MANUAL THERAPY 1/> REGIONS: CPT

## 2019-10-30 PROCEDURE — 97110 THERAPEUTIC EXERCISES: CPT

## 2019-10-30 NOTE — PROGRESS NOTES
Daily Note     Today's date: 10/30/2019  Patient name: Amisha Chandra  : 1970  MRN: [id number]  Referring provider: Mariama Joseph MD  Dx:   Encounter Diagnosis     ICD-10-CM    1  Iliotibial band syndrome of left side M76 32                 Subjective: Patient reports left ITB pain was worse yesterday "maybe from the therapy "    Objective: See treatment diary below  Assessment: Graston treatment is tolerated well - moderate erythema noted post treatment  Patient demonstrates good technique during self stretching  Plan: Continue treatment as per PT plan of care  Precautions: none    Manual  10/28 10/30           graston L ITB 8' 8'           laser L ITB  4'                                                      Exercise Diary   10/30           bike  8'           Piriformis stretch figure 4  30"x4           Hs stretch strap  30"x4           Gluteal stretch             ITB stretch strap  30"x4           Short foot             Prone hip ir/er  1x30           clamshells  1x30           Reverse clamshells             Progress to abd PREs  as pain improves                                                                                                                                                       Modalities

## 2019-11-04 ENCOUNTER — APPOINTMENT (OUTPATIENT)
Dept: PHYSICAL THERAPY | Facility: CLINIC | Age: 49
End: 2019-11-04
Payer: COMMERCIAL

## 2019-11-06 ENCOUNTER — OFFICE VISIT (OUTPATIENT)
Dept: PHYSICAL THERAPY | Facility: CLINIC | Age: 49
End: 2019-11-06
Payer: COMMERCIAL

## 2019-11-06 DIAGNOSIS — M76.32 ILIOTIBIAL BAND SYNDROME OF LEFT SIDE: Primary | ICD-10-CM

## 2019-11-06 PROCEDURE — 97110 THERAPEUTIC EXERCISES: CPT | Performed by: PHYSICAL THERAPIST

## 2019-11-06 PROCEDURE — 97140 MANUAL THERAPY 1/> REGIONS: CPT | Performed by: PHYSICAL THERAPIST

## 2019-11-06 NOTE — PROGRESS NOTES
Daily Note     Today's date: 2019  Patient name: Ravindra Lewis  : 1970  MRN: [id number]  Referring provider: Wilber Adams MD  Dx:   Encounter Diagnosis     ICD-10-CM    1  Iliotibial band syndrome of left side M76 32                 Subjective: Pt reports min pain upon arrival to PT but that he has "not done much today"    Objective: See treatment diary below  Assessment: good tolerance to manual tx, mod erythema noted with graston  Progress as ze NV  Plan: Continue treatment as per PT plan of care  Precautions: none    Manual  10/28 10/30 11/6          graston L ITB 8' 8' 8'          laser L ITB  4' 4'                                                     Exercise Diary   10/30 11/6          bike  8' 8'lvl 3          Piriformis stretch figure 4  30"x4 30"x4          Hs stretch strap  30"x4 30"x4          Gluteal stretch   30"x4          ITB stretch strap  30"x4 30"x4          Short foot             Prone hip ir/er  1x30           clamshells  1x30           Reverse clamshells   30          Progress to abd PREs  as pain improves                                                                                                                                                       Modalities

## 2019-11-11 ENCOUNTER — OFFICE VISIT (OUTPATIENT)
Dept: PHYSICAL THERAPY | Facility: CLINIC | Age: 49
End: 2019-11-11
Payer: COMMERCIAL

## 2019-11-11 DIAGNOSIS — M76.32 ILIOTIBIAL BAND SYNDROME OF LEFT SIDE: Primary | ICD-10-CM

## 2019-11-11 PROCEDURE — 97140 MANUAL THERAPY 1/> REGIONS: CPT

## 2019-11-11 PROCEDURE — 97112 NEUROMUSCULAR REEDUCATION: CPT

## 2019-11-11 PROCEDURE — 97110 THERAPEUTIC EXERCISES: CPT

## 2019-11-11 NOTE — PROGRESS NOTES
Daily Note     Today's date: 2019  Patient name: Anshu Mobley  : 1970  MRN: [id number]  Referring provider: Cristina Vizcarra MD  Dx:   Encounter Diagnosis     ICD-10-CM    1  Iliotibial band syndrome of left side M76 32                 Subjective: Pt state she has increased soreness today  Pt had pain down his left leg and into his left groin  Objective: See treatment diary below  Assessment: Pt trailed prone progression which he tolerated well having decreased symptoms down his left LE  Pt ended session with no groin pain and mid low back pain post prone press ups  Pt was given standing extensions and prone press ups to trial at home  Pt would continue to benefit from PT  Plan: Continue treatment as per PT plan of care  Consider repeated movements next session  Precautions: none    Manual  10/28 10/30 11/6 11/11         graston L ITB 8' 8' 8' 8' CF         laser L ITB  4' 4' np                                                    Exercise Diary   10/30 11/6 11/11         bike  8' 8'lvl 3 8' lvl 3          Piriformis stretch figure 4  30"x4 30"x4 30" x 4          Hs stretch strap  30"x4 30"x4 30" x 4          Gluteal stretch   30"x4 30" x 4          ITB stretch strap  30"x4 30"x4 30" x 4          Short foot             Prone hip ir/er  1x30           clamshells  1x30           Reverse clamshells   30 30x          Progress to abd PREs  as pain improves                              Prone progression     10x ea                                                                                                                      Modalities

## 2019-11-13 ENCOUNTER — APPOINTMENT (OUTPATIENT)
Dept: PHYSICAL THERAPY | Facility: CLINIC | Age: 49
End: 2019-11-13
Payer: COMMERCIAL

## 2019-11-18 ENCOUNTER — OFFICE VISIT (OUTPATIENT)
Dept: PHYSICAL THERAPY | Facility: CLINIC | Age: 49
End: 2019-11-18
Payer: COMMERCIAL

## 2019-11-18 DIAGNOSIS — M76.32 ILIOTIBIAL BAND SYNDROME OF LEFT SIDE: Primary | ICD-10-CM

## 2019-11-18 PROCEDURE — 97110 THERAPEUTIC EXERCISES: CPT

## 2019-11-18 PROCEDURE — 97140 MANUAL THERAPY 1/> REGIONS: CPT

## 2019-11-18 NOTE — PROGRESS NOTES
Daily Note     Today's date: 2019  Patient name: Mansi Baird  : 1970  MRN: [id number]  Referring provider: Irena Viveros MD  Dx:   Encounter Diagnosis     ICD-10-CM    1  Iliotibial band syndrome of left side M76 32                 Subjective: Patient reports "today it's not too bad" however reports overall "it's actually been worse "  Patient reports increased left IT band pain when sitting for prolonged periods of time - reports when performing single knee to chest stretching, left groin pain increases  Patient reports he has been performing prone repeated extensions regularly - reports symptoms are unchanged  Objective: See treatment diary below  Assessment: Treatment is tolerated well without complaints  Patient may benefit from use of lumbar roll when sitting - he agrees  Plan: Continue treatment as per PT plan of care  Precautions: none    Manual  10/28 10/30 11/6 11/11 11/18        graston L ITB 8' 8' 8' 8' CF 8'        laser L ITB  4' 4' np 4'                                                   Exercise Diary   10/30 11/6 11/11 11/18        bike  8' 8'lvl 3 8' lvl 3  8'        Piriformis stretch figure 4  30"x4 30"x4 30" x 4          Hs stretch strap  30"x4 30"x4 30" x 4  30"x4        Gluteal stretch   30"x4 30" x 4          ITB stretch strap  30"x4 30"x4 30" x 4  30"x4        Short foot             Prone hip ir/er  1x30           clamshells  1x30           Reverse clamshells   30 30x          Progress to abd PREs  as pain improves                              Prone progression     10x ea  prone press up 3x10         sidelying slr abd     3x10                                                                                                       Modalities

## 2019-11-20 ENCOUNTER — APPOINTMENT (OUTPATIENT)
Dept: PHYSICAL THERAPY | Facility: CLINIC | Age: 49
End: 2019-11-20
Payer: COMMERCIAL

## 2019-11-25 ENCOUNTER — OFFICE VISIT (OUTPATIENT)
Dept: PHYSICAL THERAPY | Facility: CLINIC | Age: 49
End: 2019-11-25
Payer: COMMERCIAL

## 2019-11-25 DIAGNOSIS — M76.32 ILIOTIBIAL BAND SYNDROME OF LEFT SIDE: Primary | ICD-10-CM

## 2019-11-25 PROCEDURE — 97110 THERAPEUTIC EXERCISES: CPT | Performed by: PHYSICAL THERAPIST

## 2019-11-25 PROCEDURE — 97140 MANUAL THERAPY 1/> REGIONS: CPT | Performed by: PHYSICAL THERAPIST

## 2019-11-25 NOTE — PROGRESS NOTES
Daily Note     Today's date: 2019  Patient name: Anshu Mobley  : 1970  MRN: [id number]  Referring provider: Cristina Vizcarra MD  Dx:   Encounter Diagnosis     ICD-10-CM    1  Iliotibial band syndrome of left side M76 32                 Subjective: Pt reports no sig change upon upon arrival today  Objective: See treatment diary below  Assessment: Added hip ir MWM to address limitation with this motion  Sig increase in pain free motion noted  Instructed pt to perform prone hip IR several times a day until his next visit  Monitor response NV  Plan: Continue treatment as per PT plan of care  Precautions: none    Manual  10/28 10/30 11/6 11/11 11/18 11/25       graston L ITB 8' 8' 8' 8' CF 8' 8'       laser L ITB  4' 4' np 4' 4'       Hip IR MWM      kl                                     Exercise Diary   10/30 11/6 11/11 11/18 11/25       bike  8' 8'lvl 3 8' lvl 3  8' 8'lvl 3       Piriformis stretch figure 4  30"x4 30"x4 30" x 4          Hs stretch strap  30"x4 30"x4 30" x 4  30"x4 30"x4       Gluteal stretch   30"x4 30" x 4          ITB stretch strap  30"x4 30"x4 30" x 4  30"x4 30"x4       Short foot             Prone hip ir/er  1x30           clamshells  1x30           Reverse clamshells   30 30x          Progress to abd PREs  as pain improves                              Prone progression     10x ea  prone press up 3x10  3x10       sidelying slr abd     3x10 3x10                                                                                                      Modalities

## 2019-11-27 ENCOUNTER — OFFICE VISIT (OUTPATIENT)
Dept: PHYSICAL THERAPY | Facility: CLINIC | Age: 49
End: 2019-11-27
Payer: COMMERCIAL

## 2019-11-27 DIAGNOSIS — M76.32 ILIOTIBIAL BAND SYNDROME OF LEFT SIDE: Primary | ICD-10-CM

## 2019-11-27 PROCEDURE — 97110 THERAPEUTIC EXERCISES: CPT | Performed by: PHYSICAL THERAPIST

## 2019-11-27 PROCEDURE — 97140 MANUAL THERAPY 1/> REGIONS: CPT | Performed by: PHYSICAL THERAPIST

## 2019-11-27 NOTE — PROGRESS NOTES
Daily Note     Today's date: 2019  Patient name: Nickolas De La Rosa  : 1970  MRN: [id number]  Referring provider: Philip Ram MD  Dx:   Encounter Diagnosis     ICD-10-CM    1  Iliotibial band syndrome of left side M76 32                 Subjective: Pt reports slight improvements since his LV  Objective: See treatment diary below  Assessment: sig improvements in hip ir rom noted with MWM  Plan to have 2-3 more session to determine if this helps to reduce symptoms, if not plan to refer back to MD for possible additional imaging  Plan: Continue treatment as per PT plan of care  Precautions: none    Manual  10/28 10/30 11/6 11/11 11/18 11/25 11/27      graston L ITB 8' 8' 8' 8' CF 8' 8' 8'      laser L ITB  4' 4' np 4' 4' 4'      Hip IR MWM      [facility]                                    Exercise Diary   10/30 11/6 11/11 11/18 11/25 11/27      bike  8' 8'lvl 3 8' lvl 3  8' 8'lvl 3 8"lvl3      Piriformis stretch figure 4  30"x4 30"x4 30" x 4          Hs stretch strap  30"x4 30"x4 30" x 4  30"x4 30"x4 30  x4      Gluteal stretch   30"x4 30" x 4          ITB stretch strap  30"x4 30"x4 30" x 4  30"x4 30"x4 30"x4      Short foot             Prone hip ir/er  1x30           clamshells  1x30           Reverse clamshells   30 30x          Progress to abd PREs  as pain improves                              Prone progression     10x ea  prone press up 3x10  3x10 3x10      sidelying slr abd     3x10 3x10 30                                                                                                     Modalities

## 2019-12-04 ENCOUNTER — OFFICE VISIT (OUTPATIENT)
Dept: PHYSICAL THERAPY | Facility: CLINIC | Age: 49
End: 2019-12-04
Payer: COMMERCIAL

## 2019-12-04 DIAGNOSIS — M76.32 ILIOTIBIAL BAND SYNDROME OF LEFT SIDE: Primary | ICD-10-CM

## 2019-12-04 PROCEDURE — 97140 MANUAL THERAPY 1/> REGIONS: CPT

## 2019-12-04 PROCEDURE — 97110 THERAPEUTIC EXERCISES: CPT

## 2019-12-04 NOTE — PROGRESS NOTES
Daily Note     Today's date: 2019  Patient name: Senait Villagomez  : 1970  MRN: [id number]  Referring provider: Jamar Lopez MD  Dx:   Encounter Diagnosis     ICD-10-CM    1  Iliotibial band syndrome of left side M76 32                 Subjective: Patient reports left IT band pain is improving  Patient reports he went skiing on Saturday night - reports he had pain in the left hip afterwards but the pain did not radiate down the left leg  Objective: See treatment diary below  Assessment: Tissue restriction noted proximal IT band during IASTM  Mild fatigue noted when performing clamshells  Plan: Continue treatment as per PT plan of care  Precautions: none    Manual  10/28 10/30 11/6 11/11 11/18 11/25 11/27 12     graston L ITB 8' 8' 8' 8' CF 8' 8' 8' 8'     laser L ITB  4' 4' np 4' 4' 4' 4'     Hip IR MWM      kl kl 3'                                   Exercise Diary   10/30 11/6 11/11 11/18 11/25 11/27 12     bike  8' 8'lvl 3 8' lvl 3  8' 8'lvl 3 8"lvl3 8'     Piriformis stretch figure 4  30"x4 30"x4 30" x 4          Hs stretch strap  30"x4 30"x4 30" x 4  30"x4 30"x4 30  x4 30"x4     Gluteal stretch   30"x4 30" x 4          ITB stretch strap  30"x4 30"x4 30" x 4  30"x4 30"x4 30"x4 30"x4     Short foot             Prone hip ir/er  1x30           clamshells  1x30      black  3x10     Reverse clamshells   30 30x          Progress to abd PREs  as pain improves                              Prone progression     10x ea  prone press up 3x10  3x10 3x10      sidelying slr abd     3x10 3x10 30 3x10                                                                                                    Modalities

## 2019-12-11 ENCOUNTER — OFFICE VISIT (OUTPATIENT)
Dept: PHYSICAL THERAPY | Facility: CLINIC | Age: 49
End: 2019-12-11
Payer: COMMERCIAL

## 2019-12-11 DIAGNOSIS — M76.32 ILIOTIBIAL BAND SYNDROME OF LEFT SIDE: Primary | ICD-10-CM

## 2019-12-11 PROCEDURE — 97140 MANUAL THERAPY 1/> REGIONS: CPT

## 2019-12-11 PROCEDURE — 97110 THERAPEUTIC EXERCISES: CPT

## 2019-12-11 NOTE — PROGRESS NOTES
Daily Note     Today's date: 2019  Patient name: Steffi Zarate  : 1970  MRN: [id number]  Referring provider: Livia Dutta MD  Dx:   Encounter Diagnosis     ICD-10-CM    1  Iliotibial band syndrome of left side M76 32                 Subjective: Patient reports he went skiing again last Saturday and "it was sore by the end of the day" - however the left hip wasn't really sore on   Patient reports "it's still kind of there in the hip "  Patient describes feeling a "nagging pain" at times  Objective: See treatment diary below  Assessment: Progression of therapeutic exercise program is tolerated well without complaints  Left hip internal rotation ROM improves with mobilizations with movement  Plan: Continue treatment as per PT plan of care       Precautions: none    Manual  10/28 10/30 11/6 11/11 11/18 11/25 11/27 12/4 12/11    graston L ITB 8' 8' 8' 8' CF 8' 8' 8' 8' 8'    laser L ITB  4' 4' np 4' 4' 4' 4' 4'    Hip IR MWM      kl kl 3' 3'                                  Exercise Diary   10/30 11/6 11/11 11/18 11/25 11/27 12/4 12/11    bike  8' 8'lvl 3 8' lvl 3  8' 8'lvl 3 8"lvl3 8' 6'    Piriformis stretch figure 4  30"x4 30"x4 30" x 4          Hs stretch strap  30"x4 30"x4 30" x 4  30"x4 30"x4 30  x4 30"x4 30"x4    Gluteal stretch   30"x4 30" x 4          ITB stretch strap  30"x4 30"x4 30" x 4  30"x4 30"x4 30"x4 30"x4 30"x4    Short foot             Prone hip ir/er  1x30           clamshells  1x30      black  3x10 black  3x10    Reverse clamshells   30 30x          Progress to abd PREs  as pain improves                          Prone progression     10x ea  prone press up 3x10  3x10 3x10      sidelying slr abd     3x10 3x10 30 3x10 2#  3x10    prone glute lift         2#  3x10    Cross over step up         8 inch  3x10                                                                         Modalities

## 2019-12-18 ENCOUNTER — APPOINTMENT (OUTPATIENT)
Dept: PHYSICAL THERAPY | Facility: CLINIC | Age: 49
End: 2019-12-18
Payer: COMMERCIAL

## 2019-12-23 ENCOUNTER — OFFICE VISIT (OUTPATIENT)
Dept: PHYSICAL THERAPY | Facility: CLINIC | Age: 49
End: 2019-12-23
Payer: COMMERCIAL

## 2019-12-23 DIAGNOSIS — M76.32 ILIOTIBIAL BAND SYNDROME OF LEFT SIDE: Primary | ICD-10-CM

## 2019-12-23 PROCEDURE — 97140 MANUAL THERAPY 1/> REGIONS: CPT

## 2019-12-23 PROCEDURE — 97110 THERAPEUTIC EXERCISES: CPT

## 2019-12-23 NOTE — PROGRESS NOTES
Daily Note     Today's date: 2019  Patient name: Joyce Flores  : 1970  MRN: [id number]  Referring provider: Esau Marroquin MD  Dx:   Encounter Diagnosis     ICD-10-CM    1  Iliotibial band syndrome of left side M76 32                 Subjective: Patient complains of continued left hip pain however pain in the distal IT band has improved  Objective: See treatment diary below  Assessment: Therapeutic exercise program is tolerated well without complaints of increased left hip pain  IASTM is tolerated well without complaints of significant tenderness in the left IT band  Left hip internal rotation ROM improves with mobilizations with movement  Plan: Hold any further skilled PT until after follow up with referring physician        Precautions: none    Manual  10/28 10/30 11/6 11/11 11/18 11/25 11/27 12/4 12/11 12/23   graston L ITB 8' 8' 8' 8' CF 8' 8' 8' 8' 8' 8'   laser L ITB  4' 4' np 4' 4' 4' 4' 4' 4'   Hip IR MWM      kl kl 3' 3' 3'                                 Exercise Diary   10/30 11/6 11/11 11/18 11/25 11/27 12/4 12/11 12/23   bike  8' 8'lvl 3 8' lvl 3  8' 8'lvl 3 8"lvl3 8' 6' 8'   Piriformis stretch figure 4  30"x4 30"x4 30" x 4          Hs stretch strap  30"x4 30"x4 30" x 4  30"x4 30"x4 30  x4 30"x4 30"x4 30"x4   Gluteal stretch   30"x4 30" x 4          ITB stretch strap  30"x4 30"x4 30" x 4  30"x4 30"x4 30"x4 30"x4 30"x4 30"x4   Short foot             Prone hip ir/er  1x30           clamshells  1x30      black  3x10 black  3x10 black  3x10   Reverse clamshells   30 30x          Progress to abd PREs  as pain improves                          Prone progression     10x ea  prone press up 3x10  3x10 3x10      sidelying slr abd     3x10 3x10 30 3x10 2#  3x10 2#  3x10   prone glute lift         2#  3x10 2#  3x10   Cross over step up         8 inch  3x10 8 inch  3x10                                                                        Modalities

## 2019-12-26 ENCOUNTER — APPOINTMENT (OUTPATIENT)
Dept: PHYSICAL THERAPY | Facility: CLINIC | Age: 49
End: 2019-12-26
Payer: COMMERCIAL

## 2019-12-27 ENCOUNTER — OFFICE VISIT (OUTPATIENT)
Dept: FAMILY MEDICINE CLINIC | Facility: CLINIC | Age: 49
End: 2019-12-27
Payer: COMMERCIAL

## 2019-12-27 VITALS
TEMPERATURE: 98.7 F | OXYGEN SATURATION: 96 % | WEIGHT: 186 LBS | DIASTOLIC BLOOD PRESSURE: 71 MMHG | HEART RATE: 71 BPM | BODY MASS INDEX: 28.28 KG/M2 | SYSTOLIC BLOOD PRESSURE: 108 MMHG

## 2019-12-27 DIAGNOSIS — Z00.00 ENCOUNTER FOR ANNUAL PHYSICAL EXAM: Primary | ICD-10-CM

## 2019-12-27 DIAGNOSIS — R35.1 NOCTURIA: ICD-10-CM

## 2019-12-27 DIAGNOSIS — M25.552 PAIN OF LEFT HIP JOINT: ICD-10-CM

## 2019-12-27 DIAGNOSIS — M76.32 ILIOTIBIAL BAND SYNDROME OF LEFT SIDE: ICD-10-CM

## 2019-12-27 LAB
SL AMB  POCT GLUCOSE, UA: NORMAL
SL AMB LEUKOCYTE ESTERASE,UA: NORMAL
SL AMB POCT BILIRUBIN,UA: NORMAL
SL AMB POCT BLOOD,UA: NORMAL
SL AMB POCT CLARITY,UA: CLEAR
SL AMB POCT COLOR,UA: YELLOW
SL AMB POCT KETONES,UA: NORMAL
SL AMB POCT NITRITE,UA: NORMAL
SL AMB POCT PH,UA: 5
SL AMB POCT SPECIFIC GRAVITY,UA: 1.01
SL AMB POCT URINE PROTEIN: NORMAL
SL AMB POCT UROBILINOGEN: 0.2

## 2019-12-27 PROCEDURE — 99396 PREV VISIT EST AGE 40-64: CPT | Performed by: FAMILY MEDICINE

## 2019-12-27 PROCEDURE — 81003 URINALYSIS AUTO W/O SCOPE: CPT | Performed by: FAMILY MEDICINE

## 2019-12-27 RX ORDER — MELOXICAM 15 MG/1
15 TABLET ORAL DAILY
Qty: 30 TABLET | Refills: 2 | Status: SHIPPED | OUTPATIENT
Start: 2019-12-27 | End: 2020-03-20

## 2019-12-27 NOTE — PROGRESS NOTES
FAMILY PRACTICE OFFICE VISIT       NAME: Immanuel Coleman  AGE: 44 y o  SEX: male       : 1970        MRN: [id number]    DATE: 2019  TIME: 3:39 PM    Assessment and Plan     Problem List Items Addressed This Visit        Musculoskeletal and Integument    Iliotibial band syndrome of left side    Relevant Medications    meloxicam (MOBIC) 15 mg tablet       Other    Encounter for annual physical exam - Primary     Well adult  Patient overall appears to be in stable health  He will obtain blood work as ordered for further evaluation  His colonoscopy is up-to-date  Relevant Orders    CBC    Comprehensive metabolic panel    Lipid panel    TSH, 3rd generation    POCT urine dip auto non-scope    Nocturia    Relevant Orders    PSA, Total Screen    Pain of left hip joint     Hip pain  Patient was given prescription to obtain x-ray of left hip  He was given referral to see [address] group for further evaluation  He will postpone any further physical therapy at this time to he has evaluation with Orthopedics  Relevant Orders    XR hip/pelv 2-3 vws left if performed    Ambulatory referral to Orthopedic Surgery              Chief Complaint     Chief Complaint   Patient presents with    Follow-up       History of Present Illness     Patient year for annual wellness exam   He continues to experience left hip discomfort despite having several visits with physical therapy  He does obtain a regular form of exercise  His colonoscopy is up-to-date  Patient refuses annual flu vaccine      Review of Systems   Review of Systems   Constitutional: Negative  HENT: Negative  Respiratory: Negative  Cardiovascular: Negative  Gastrointestinal: Negative  Genitourinary: Negative  Musculoskeletal:        As per HPI   Neurological: Negative  Psychiatric/Behavioral: Negative          Active Problem List     Patient Active Problem List   Diagnosis    Encounter for annual physical exam    Nocturia    Allergic rhinitis    Mallet finger of left finger(s)    Iliotibial band syndrome of left side    Pain of left hip joint       Past Medical History:  Past Medical History:   Diagnosis Date    Known health problems: none        Past Surgical History:  No past surgical history on file      Family History:  Family History   Problem Relation Age of Onset   [facility] Hyperlipidemia Mother     Diabetes Father     COPD Father     Hyperlipidemia Father     Hypertension Father        Social History:  Social History     Socioeconomic History    Marital status: Single     Spouse name: Not on file    Number of children: Not on file    Years of education: Not on file    Highest education level: Not on file   Occupational History    Not on file   Social Needs    Financial resource strain: Not on file    Food insecurity:     Worry: Not on file     Inability: Not on file    Transportation needs:     Medical: Not on file     Non-medical: Not on file   Tobacco Use    Smoking status: Never Smoker    Smokeless tobacco: Never Used   Substance and Sexual Activity    Alcohol use: Yes     Comment: rare    Drug use: No    Sexual activity: Not on file   Lifestyle    Physical activity:     Days per week: Not on file     Minutes per session: Not on file    Stress: Not on file   Relationships    Social connections:     Talks on phone: Not on file     Gets together: Not on file     Attends [employer] service: Not on file     Active member of club or organization: Not on file     Attends meetings of clubs or organizations: Not on file     Relationship status: Not on file    Intimate partner violence:     Fear of current or ex partner: Not on file     Emotionally abused: Not on file     Physically abused: Not on file     Forced sexual activity: Not on file   Other Topics Concern    Not on file   Social History Narrative    Not on file       Objective     Vitals:    12/27/19 1503   BP: 108/71   Pulse: 71   Temp: 98 7 °F (37 1 °C)   SpO2: 96%     Wt Readings from Last 3 Encounters:   12/27/19 84 4 kg (186 lb)   10/08/19 84 5 kg (186 lb 3 2 oz)   09/16/19 83 9 kg (185 lb)       Physical Exam   Constitutional: No distress  HENT:   Right Ear: External ear normal    Left Ear: External ear normal    Mouth/Throat: Oropharynx is clear and moist  No oropharyngeal exudate  Tympanic membranes within normal limits bilaterally   Neck:   No carotid bruits   Cardiovascular: Normal rate, regular rhythm and normal heart sounds  No murmur heard  Pulmonary/Chest: Effort normal and breath sounds normal  No respiratory distress  He has no wheezes  He has no rales  Abdominal:   Abdomen is soft, nontender with positive bowel sounds  There is no rebound or guarding  No masses palpated   Musculoskeletal: He exhibits no edema  Lymphadenopathy:     He has no cervical adenopathy  Neurological: He is alert  Psychiatric: He has a normal mood and affect   His behavior is normal  Judgment and thought content normal        Pertinent Laboratory/Diagnostic Studies:  Lab Results   Component Value Date    BUN 26 (H) 12/19/2018    CREATININE 1 17 12/19/2018    CALCIUM 8 3 (L) 12/19/2018     08/03/2017    K 4 4 12/19/2018    CO2 30 12/19/2018     12/19/2018     Lab Results   Component Value Date    ALT 17 12/19/2018    AST 18 12/19/2018    ALKPHOS 66 12/19/2018    BILITOT 0 7 08/03/2017       Lab Results   Component Value Date    WBC 5 4 12/19/2018    HGB 15 1 12/19/2018    HCT 45 0 12/19/2018    MCV 93 0 12/19/2018     12/19/2018       Lab Results   Component Value Date    TSH 2 99 12/19/2018       Lab Results   Component Value Date    CHOL 186 08/03/2017     Lab Results   Component Value Date    TRIG 97 12/19/2018     Lab Results   Component Value Date    HDL 46 12/19/2018     No results found for: LDLCALC  No results found for: HGBA1C    Results for orders placed or performed in visit on 12/19/18   Lipid panel Result Value Ref Range    Total Cholesterol 184 <200 mg/dL    HDL 46 >40 mg/dL    Triglycerides 97 <150 mg/dL    LDL Direct 118 (H) mg/dL (calc)    Chol HDLC Ratio 4 0 <5 0 (calc)    Non-HDL Cholesterol 138 (H) <130 mg/dL (calc)   Comprehensive metabolic panel   Result Value Ref Range    Glucose, Random 84 65 - 99 mg/dL    BUN 26 (H) 7 - 25 mg/dL    Creatinine 1 17 0 70 - 1 33 mg/dL    eGFR Non  72 > OR = 60 mL/min/1 73m2    eGFR  84 > OR = 60 mL/min/1 73m2    SL AMB BUN/CREATININE RATIO 22 6 - 22 (calc)    Sodium 139 135 - 146 mmol/L    Potassium 4 4 3 5 - 5 3 mmol/L    Chloride 104 98 - 110 mmol/L    CO2 30 20 - 32 mmol/L    SL AMB CALCIUM 8 3 (L) 8 6 - 10 3 mg/dL    Protein, Total 6 5 6 1 - 8 1 g/dL    Albumin 4 0 3 6 - 5 1 g/dL    Globulin 2 5 1 9 - 3 7 g/dL (calc)    Albumin/Globulin Ratio 1 6 1 0 - 2 5 (calc)    TOTAL BILIRUBIN 0 9 0 2 - 1 2 mg/dL    Alkaline Phosphatase 66 40 - 115 U/L    AST 18 10 - 35 U/L    ALT 17 9 - 46 U/L   CBC and differential   Result Value Ref Range    White Blood Cell Count 5 4 3 8 - 10 8 Thousand/uL    Red Blood Cell Count 4 84 4 20 - 5 80 Million/uL    Hemoglobin 15 1 13 2 - 17 1 g/dL    HCT 45 0 38 5 - 50 0 %    MCV 93 0 80 0 - 100 0 fL    MCH 31 2 27 0 - 33 0 pg    MCHC 33 6 32 0 - 36 0 g/dL    RDW 11 8 11 0 - 15 0 %    Platelet Count 335 841 - 400 Thousand/uL    SL AMB MPV 10 5 7 5 - 12 5 fL    Neutrophils (Absolute) 2,803 1,500 - 7,800 cells/uL    Lymphocytes (Absolute) 2,052 850 - 3,900 cells/uL    Monocytes (Absolute) 389 200 - 950 cells/uL    Eosinophils (Absolute) 119 15 - 500 cells/uL    Basophils ABS 38 0 - 200 cells/uL    Neutrophils 51 9 %    Lymphocytes 38 0 %    Monocytes 7 2 %    Eosinophils 2 2 %    Basophils PCT 0 7 %   TSH, 3rd generation   Result Value Ref Range    TSH 2 99 0 40 - 4 50 mIU/L   PSA, Total Screen   Result Value Ref Range    Prostate Specific Antigen Total 0 2 < OR = 4 0 ng/mL       Orders Placed This Encounter Procedures    XR hip/pelv 2-3 vws left if performed    CBC    Comprehensive metabolic panel    Lipid panel    TSH, 3rd generation    PSA, Total Screen    Ambulatory referral to Orthopedic Surgery    POCT urine dip auto non-scope       ALLERGIES:  No Known Allergies    Current Medications     Current Outpatient Medications   Medication Sig Dispense Refill    ipratropium (ATROVENT) 0 03 % nasal spray SPRAY 2 SPRAYS 2 TO 4 TIMES A DAY AS NEEDED FOR NASAL CONGESTION 30 mL 4    montelukast (SINGULAIR) 10 mg tablet TAKE ONE (1) TABLET(S) DAILY 30 tablet 5    meloxicam (MOBIC) 15 mg tablet Take 1 tablet (15 mg total) by mouth daily 30 tablet 2    VENTOLIN  (90 Base) MCG/ACT inhaler        No current facility-administered medications for this visit  Health Maintenance     Health Maintenance   Topic Date Due    OT PLAN OF CARE  1970    Pneumococcal Vaccine: Pediatrics (0 to 5 Years) and At-Risk Patients (6 to 59 Years) (1 of 1 - PPSV23) 07/15/1974    DTaP,Tdap,and Td Vaccines (1 - Tdap) 07/15/1979    HIV Screening  07/15/1983    BMI: Followup Plan  07/15/1986    Influenza Vaccine  07/01/2019    PT PLAN OF CARE  11/27/2019    BMI: Adult  10/08/2020    Depression Screening PHQ  10/28/2020    CRC Screening: Colonoscopy  02/25/2029    Pneumococcal Vaccine: 65+ Years (1 of 2 - PCV13) 07/15/2033    HIB Vaccine  Aged Out    Hepatitis B Vaccine  Aged Out    IPV Vaccine  Aged Out    Hepatitis A Vaccine  Aged Out    Meningococcal ACWY Vaccine  Aged Out    HPV Vaccine  Aged Out     There is no immunization history for the selected administration types on file for this patient      Marnie Schwab MD

## 2019-12-27 NOTE — ASSESSMENT & PLAN NOTE
Well adult  Patient overall appears to be in stable health  He will obtain blood work as ordered for further evaluation  His colonoscopy is up-to-date

## 2019-12-27 NOTE — ASSESSMENT & PLAN NOTE
Hip pain  Patient was given prescription to obtain x-ray of left hip  He was given referral to see [address] group for further evaluation  He will postpone any further physical therapy at this time to he has evaluation with Orthopedics

## 2020-01-03 ENCOUNTER — OFFICE VISIT (OUTPATIENT)
Dept: OBGYN CLINIC | Facility: CLINIC | Age: 50
End: 2020-01-03
Payer: COMMERCIAL

## 2020-01-03 ENCOUNTER — APPOINTMENT (OUTPATIENT)
Dept: RADIOLOGY | Facility: CLINIC | Age: 50
End: 2020-01-03
Payer: COMMERCIAL

## 2020-01-03 VITALS
SYSTOLIC BLOOD PRESSURE: 139 MMHG | BODY MASS INDEX: 26.98 KG/M2 | WEIGHT: 178 LBS | DIASTOLIC BLOOD PRESSURE: 70 MMHG | HEIGHT: 68 IN

## 2020-01-03 DIAGNOSIS — M25.552 PAIN IN LEFT HIP: Primary | ICD-10-CM

## 2020-01-03 DIAGNOSIS — M25.552 PAIN OF LEFT HIP JOINT: ICD-10-CM

## 2020-01-03 DIAGNOSIS — M25.552 PAIN IN LEFT HIP: ICD-10-CM

## 2020-01-03 DIAGNOSIS — M16.12 ARTHRITIS OF LEFT HIP: ICD-10-CM

## 2020-01-03 PROCEDURE — 99213 OFFICE O/P EST LOW 20 MIN: CPT | Performed by: ORTHOPAEDIC SURGERY

## 2020-01-03 PROCEDURE — 73502 X-RAY EXAM HIP UNI 2-3 VIEWS: CPT

## 2020-01-03 NOTE — PROGRESS NOTES
Orthopaedic Surgery Note    CC: Left Hip Pain      HPI:  Mr Tray Armendariz  is a 44 y o male with a history of left hip pain  He reports that this has been ongoing for at least 12 months  It is described as being in the groin, buttocks, and lateral aspect of the hip  Does radiate to the thigh  He has previously tried PT for several months duration and that did not help  He has tried NSAIDs and tylenol and these provided some relief  Has not tried any injections  At this time he describes the pain as aching, sharp, sore, stiffness, grinding, instability  [place] Neigh is worse with running, walking, lateral movements  Pain is moderate  It is interfering with his hobbies and ADLs  It is waking him from sleep  ALLERGIES:  No Known Allergies    CURRENT MEDICATIONS:  Current Outpatient Medications   Medication Sig Dispense Refill    ipratropium (ATROVENT) 0 03 % nasal spray SPRAY 2 SPRAYS 2 TO 4 TIMES A DAY AS NEEDED FOR NASAL CONGESTION 30 mL 4    meloxicam (MOBIC) 15 mg tablet Take 1 tablet (15 mg total) by mouth daily 30 tablet 2    montelukast (SINGULAIR) 10 mg tablet TAKE ONE (1) TABLET(S) DAILY 30 tablet 5    VENTOLIN  (90 Base) MCG/ACT inhaler        No current facility-administered medications for this visit  PAST MEDICAL HISTORY  Past Medical History:   Diagnosis Date    Known health problems: none        SURGICAL HISTORY  History reviewed  No pertinent surgical history      FAMILY HISTORY  Family History   Problem Relation Age of Onset    Hyperlipidemia Mother     Diabetes Father     COPD Father     Hyperlipidemia Father     Hypertension Father        SOCIAL HISTORY  Social History     Socioeconomic History    Marital status: Single     Spouse name: Not on file    Number of children: Not on file    Years of education: Not on file    Highest education level: Not on file   Occupational History    Not on file   Social Needs    Financial resource strain: Not on file  Food insecurity:     Worry: Not on file     Inability: Not on file    Transportation needs:     Medical: Not on file     Non-medical: Not on file   Tobacco Use    Smoking status: Never Smoker    Smokeless tobacco: Never Used   Substance and Sexual Activity    Alcohol use: Yes     Comment: rare    Drug use: No    Sexual activity: Not on file   Lifestyle    Physical activity:     Days per week: Not on file     Minutes per session: Not on file    Stress: Not on file   Relationships    Social connections:     Talks on phone: Not on file     Gets together: Not on file     Attends [employer] service: Not on file     Active member of club or organization: Not on file     Attends meetings of clubs or organizations: Not on file     Relationship status: Not on file    Intimate partner violence:     Fear of current or ex partner: Not on file     Emotionally abused: Not on file     Physically abused: Not on file     Forced sexual activity: Not on file   Other Topics Concern    Not on file   Social History Narrative    Not on file         Review of Systems   Metal Allergy: no  History of MRSA:no  History of DVT or PE:no  Active dental issues:no  Anesthesia complications:no    Patient indicated positive for vision problems, joint pain  Otherwise negative except per above and HPI  Physical Exam    Vitals  Vitals:    01/03/20 1432   BP: 139/70       BMI  Body mass index is 27 06 kg/m²  GENERAL: No acute distress  Alert and oriented  Well nourished and well hydrated  Appears stated age  HEENT : Normocephalic, atraumatic  Extraocular movements intact  Mucous membranes moist  NECK: Supple, trachea midline  LUNGS: Adequate and symmetric respiratory effort  No intercostal retractions or accessory muscle use  HEART: Extremities warm and perfused  ABDOMEN: Nondistended  SKIN: Warm and dry, no rash      Left  Hip Exam  Extension: 10  Flexion: 130  Internal/External Rotation: 5/20  Leg is noted to be similar in length to other leg    Imaging  A) Imaging modality available  Radiographs: yes  MRI scan: no  CT scan: no    B) Imaging findings  Subchondral cysts: yes  Subchondral sclerosis: yes  Periarticular osteophytes: yes  Joint subluxation: no  Joint space narrowing: yes  Bone-on-bone articulations: yes  Avascular necrosis: no    Assessment and Plan  Left  Hip Arthritis    Discussed nonop hip arthritis algorithm with jacob as below and he has tried much of the modalities and not achieved significant relief  At this time, he has not had IA injection and I think it would be reasonable to try IA CSI to the left hip  I will place consult to Dr Josh Ferguson and see if this doesn't provide any relief  I would like to see him back in about 3 months  If no relief with CSI then would recommend moving forward with elective L JEANCARLOS  Hip Pain / Arthritis Treatment Recommendations    Exercise - 5 minutes per day Monday, Wednesday, Friday OR Tuesday, Thursday, Saturday  Increase 5 minutes per day per week  May use bike (non-impact) or walk (impact) or swim or alternate  Goal is to get up to at least 30 minutes per day  1  Bicycle  2  Walking    Weight loss  Goal to lose 1 pound per week  Portion control, limit sweets, limit carbs    Medications  Anti-inflammatories (NSAIDs)  1  Ibuprofen (Motrin or Advil) 600 mg (3 pills) with food 3 times a day for 3 - 7 days  OR  2  Naprosyn (Aleve) 1 - 2 pills twice a day with food for 3 - 7 days    Stop if you develop upset stomach or bleeding occurs  If continued on scheduled basis for longer than 1 week I did recommend that patient seek out advice from PCP for monitoring of side-effects  Pain reliever  1   Acetaminophen (Tylenol) 2 pills (regular or extra-strength) 3 times a day for 3 - 7 days    Taken together (Acetaminophen with one of the Nsaids (ibuprofen OR naprosyn)), the combination may work better than either one alone for more acute pain              I have spent 30 minutes with Patient  today in which greater than 50% of this time was spent in counseling/coordination of care regarding Diagnostic results, Prognosis, Risks and benefits of tx options, Intructions for management and Patient and family education  Zaid Mackenzie MD  Adult Reconstruction Surgery  Department [address]  3:26 PM

## 2020-01-09 ENCOUNTER — TELEPHONE (OUTPATIENT)
Dept: PAIN MEDICINE | Facility: CLINIC | Age: 50
End: 2020-01-09

## 2020-01-09 NOTE — TELEPHONE ENCOUNTER
Patient called & left message on S/P voicemail yesterday requesting to schedule consult  I lmom with office hours & number

## 2020-01-12 LAB
ALBUMIN SERPL-MCNC: 4.2 G/DL (ref 3.6–5.1)
ALBUMIN/GLOB SERPL: 1.7 (CALC) (ref 1–2.5)
ALP SERPL-CCNC: 71 U/L (ref 40–115)
ALT SERPL-CCNC: 21 U/L (ref 9–46)
AST SERPL-CCNC: 25 U/L (ref 10–35)
BASOPHILS # BLD AUTO: 42 CELLS/UL (ref 0–200)
BASOPHILS NFR BLD AUTO: 0.8 %
BILIRUB SERPL-MCNC: 1 MG/DL (ref 0.2–1.2)
BUN SERPL-MCNC: 25 MG/DL (ref 7–25)
BUN/CREAT SERPL: NORMAL (CALC) (ref 6–22)
CALCIUM SERPL-MCNC: 9.4 MG/DL (ref 8.6–10.3)
CHLORIDE SERPL-SCNC: 104 MMOL/L (ref 98–110)
CHOLEST SERPL-MCNC: 171 MG/DL
CHOLEST/HDLC SERPL: 3.6 (CALC)
CO2 SERPL-SCNC: 29 MMOL/L (ref 20–32)
CREAT SERPL-MCNC: 1.25 MG/DL (ref 0.7–1.33)
EOSINOPHIL # BLD AUTO: 68 CELLS/UL (ref 15–500)
EOSINOPHIL NFR BLD AUTO: 1.3 %
ERYTHROCYTE [DISTWIDTH] IN BLOOD BY AUTOMATED COUNT: 12 % (ref 11–15)
GLOBULIN SER CALC-MCNC: 2.5 G/DL (CALC) (ref 1.9–3.7)
GLUCOSE SERPL-MCNC: 83 MG/DL (ref 65–99)
HCT VFR BLD AUTO: 45.2 % (ref 38.5–50)
HDLC SERPL-MCNC: 48 MG/DL
HGB BLD-MCNC: 15.2 G/DL (ref 13.2–17.1)
LDLC SERPL CALC-MCNC: 108 MG/DL (CALC)
LYMPHOCYTES # BLD AUTO: 1451 CELLS/UL (ref 850–3900)
LYMPHOCYTES NFR BLD AUTO: 27.9 %
MCH RBC QN AUTO: 30.8 PG (ref 27–33)
MCHC RBC AUTO-ENTMCNC: 33.6 G/DL (ref 32–36)
MCV RBC AUTO: 91.5 FL (ref 80–100)
MONOCYTES # BLD AUTO: 432 CELLS/UL (ref 200–950)
MONOCYTES NFR BLD AUTO: 8.3 %
NEUTROPHILS # BLD AUTO: 3208 CELLS/UL (ref 1500–7800)
NEUTROPHILS NFR BLD AUTO: 61.7 %
NONHDLC SERPL-MCNC: 123 MG/DL (CALC)
PLATELET # BLD AUTO: 185 THOUSAND/UL (ref 140–400)
PMV BLD REES-ECKER: 10.6 FL (ref 7.5–12.5)
POTASSIUM SERPL-SCNC: 4.5 MMOL/L (ref 3.5–5.3)
PROT SERPL-MCNC: 6.7 G/DL (ref 6.1–8.1)
PSA SERPL-MCNC: 0.3 NG/ML
RBC # BLD AUTO: 4.94 MILLION/UL (ref 4.2–5.8)
SL AMB EGFR AFRICAN AMERICAN: 77 ML/MIN/1.73M2
SL AMB EGFR NON AFRICAN AMERICAN: 66 ML/MIN/1.73M2
SODIUM SERPL-SCNC: 139 MMOL/L (ref 135–146)
TRIGL SERPL-MCNC: 65 MG/DL
TSH SERPL-ACNC: 3.65 MIU/L (ref 0.4–4.5)
WBC # BLD AUTO: 5.2 THOUSAND/UL (ref 3.8–10.8)

## 2020-01-21 ENCOUNTER — CONSULT (OUTPATIENT)
Dept: PAIN MEDICINE | Facility: CLINIC | Age: 50
End: 2020-01-21
Payer: COMMERCIAL

## 2020-01-21 VITALS
DIASTOLIC BLOOD PRESSURE: 70 MMHG | HEART RATE: 53 BPM | WEIGHT: 186 LBS | HEIGHT: 68 IN | SYSTOLIC BLOOD PRESSURE: 110 MMHG | BODY MASS INDEX: 28.19 KG/M2

## 2020-01-21 DIAGNOSIS — M16.12 ARTHRITIS OF LEFT HIP: Primary | ICD-10-CM

## 2020-01-21 DIAGNOSIS — M25.552 PAIN OF LEFT HIP JOINT: ICD-10-CM

## 2020-01-21 PROCEDURE — 99204 OFFICE O/P NEW MOD 45 MIN: CPT | Performed by: ANESTHESIOLOGY

## 2020-01-21 PROCEDURE — 3008F BODY MASS INDEX DOCD: CPT | Performed by: ANESTHESIOLOGY

## 2020-01-21 NOTE — PROGRESS NOTES
Assessment  1  Arthritis of left hip    2  Pain of left hip joint        Plan    Patient with left groin and anterior thigh pain pain  Patient with hip radiographs revealing  osteoarthritis  He did undergo physical therapy with some relief  At this point, I believe is reasonable to pursue a  hip joint injection that would serve both diagnostic and hopefully therapeutic purposes  Depending on his response, we will reevaluate the plan  In the office today did review the nature of the patient's pathology in depth using diagrams and models  I discussed the approach I would use for the hip joint injection provided literature for home review  I did review the risks associated with the procedure not limited to but including bleeding, tissue injury, allergic reaction, infection, exacerbation of symptoms and patient provided written and verbal consent  My impressions and treatment recommendations were discussed in detail with the patient who verbalized understanding and had no further questions  Discharge instructions were provided  I personally saw and examined the patient and I agree with the above discussed plan of care  This note is created using dictation transcription  It may contain typographical errors, grammatical errors, improperly dictated words, background noise and other errors  Orders Placed This Encounter   Procedures    FL spine and pain procedure     Standing Status:   Future     Standing Expiration Date:   1/21/2024     Order Specific Question:   Reason for Exam:     Answer:   Left hip joint injection     Order Specific Question:   Anticoagulant hold needed? Answer:   No     No orders of the defined types were placed in this encounter  REFERRED BY DR Imelda Daniels     History of Present Illness    Dayton Mcfarland  is a 44 y o  male with greater than one year history of left groin and thigh pain    He is unaware of any clear precipitating event denies any trauma or injury describes pain as moderate currently rates as 2/10 on the visual analog scale with intermittent pain  His pain is worse in the evening describes sharp and achy he denies any weakness of the upper lower limbs  He limits that lying down relaxation decreases symptoms will standing, bending, sitting, walking aggravate his symptoms  He is currently taking meloxicam with some relief  I have personally reviewed and/or updated the patient's past medical history, past surgical history, family history, social history, current medications, allergies, and vital signs today  Review of Systems   Constitutional: Negative for fever and unexpected weight change  HENT: Negative for trouble swallowing  Eyes: Negative for visual disturbance  Respiratory: Negative for shortness of breath and wheezing  Cardiovascular: Negative for chest pain and palpitations  Gastrointestinal: Negative for constipation, diarrhea, nausea and vomiting  Endocrine: Negative for cold intolerance, heat intolerance and polydipsia  Genitourinary: Negative for difficulty urinating and frequency  Musculoskeletal: Positive for joint swelling (joint pain ) and myalgias (muscle pain)  Negative for arthralgias and gait problem  Skin: Negative for rash  Neurological: Negative for dizziness, seizures, syncope, weakness and headaches  Hematological: Does not bruise/bleed easily  Psychiatric/Behavioral: Negative for dysphoric mood  All other systems reviewed and are negative  Patient Active Problem List   Diagnosis    Encounter for annual physical exam    Nocturia    Allergic rhinitis    Mallet finger of left finger(s)    Iliotibial band syndrome of left side    Pain of left hip joint       Past Medical History:   Diagnosis Date    Known health problems: none        History reviewed  No pertinent surgical history      Family History   Problem Relation Age of Onset    Hyperlipidemia Mother     Diabetes Father     COPD Father    [facility] Hyperlipidemia Father     Hypertension Father        Social History     Occupational History    Not on file   Tobacco Use    Smoking status: Never Smoker    Smokeless tobacco: Never Used   Substance and Sexual Activity    Alcohol use: Yes     Comment: rare    Drug use: No    Sexual activity: Not on file       Current Outpatient Medications on File Prior to Visit   Medication Sig    ipratropium (ATROVENT) 0 03 % nasal spray SPRAY 2 SPRAYS 2 TO 4 TIMES A DAY AS NEEDED FOR NASAL CONGESTION    meloxicam (MOBIC) 15 mg tablet Take 1 tablet (15 mg total) by mouth daily    montelukast (SINGULAIR) 10 mg tablet TAKE ONE (1) TABLET(S) DAILY    VENTOLIN  (90 Base) MCG/ACT inhaler      No current facility-administered medications on file prior to visit  No Known Allergies    Physical Exam    /70   Pulse (!) 53   Ht 5' 8" (1 727 m)   Wt 84 4 kg (186 lb)   BMI 28 28 kg/m²     Constitutional: normal, well developed, well nourished, alert, in no distress and non-toxic and no overt pain behavior  Eyes: anicteric  HEENT: grossly intact  Neck: supple, symmetric, trachea midline and no masses   Pulmonary:even and unlabored  Cardiovascular:No edema or pitting edema present  Skin:Normal without rashes or lesions and well hydrated  Psychiatric:Mood and affect appropriate  Neurologic:Cranial Nerves II-XII grossly intact  Musculoskeletal:normal,   Inspection:  Normal station and gait  Normal lumbar lordotic curve with no significant scoliosis or spinal step-off  Skin intact without erythema  No gross mass or muscle atrophy  Palpation:  There is no tenderness to palpation overlying the sacroiliac joint as well as the ischial bursa bilateral   No significant tenderness over the greater trochanteric bursa bilaterally  Motor/Strength:  5/5 strength in the bilateral lower limbs  The patient is able to heel and/toe walk  Tandem gait is intact     Reflexes: Deep tendon reflex are 2+ and symmetrical bilateral patella and Achilles  Sensation:   Sensation intact to light touch and pinprick in the bilateral lower limbs  Proprioception is intact at bilateral hallux  Maneuvers: Negative bilateral straight leg raising  Negative Raheem's maneuver  Pain with left hip internal and external rotation hip  Imaging  LEFT HIP     INDICATION:   M25 552: Pain in left hip      COMPARISON:  None     VIEWS:  XR HIP/PELV 2-3 VWS LEFT  W PELVIS IF PERFORMED         FINDINGS:     There is no acute fracture or dislocation      Severe narrowing and subchondral sclerosis seen in the superior lateral aspect of the left hip joint with large marginal osteophyte formation  Mild narrowing noted on the right      No lytic or blastic osseous lesions      Soft tissues are unremarkable      The visualized lumbar spine is unremarkable      IMPRESSION:     Bilateral, left greater than right, degenerative changes in the hips as above  I have personally reviewed pertinent films in PACS and my interpretation is In agreement with the report  Gordon Lau

## 2020-01-25 DIAGNOSIS — J45.909 UNCOMPLICATED ASTHMA, UNSPECIFIED ASTHMA SEVERITY, UNSPECIFIED WHETHER PERSISTENT: ICD-10-CM

## 2020-01-25 RX ORDER — MONTELUKAST SODIUM 10 MG/1
TABLET ORAL
Qty: 30 TABLET | Refills: 5 | Status: SHIPPED | OUTPATIENT
Start: 2020-01-25 | End: 2020-07-27

## 2020-01-30 ENCOUNTER — HOSPITAL ENCOUNTER (OUTPATIENT)
Dept: RADIOLOGY | Facility: CLINIC | Age: 50
Discharge: HOME | End: 2020-01-30
Attending: ANESTHESIOLOGY
Payer: COMMERCIAL

## 2020-01-30 VITALS
HEART RATE: 66 BPM | OXYGEN SATURATION: 96 % | SYSTOLIC BLOOD PRESSURE: 122 MMHG | DIASTOLIC BLOOD PRESSURE: 70 MMHG | RESPIRATION RATE: 20 BRPM | TEMPERATURE: 47.3 F

## 2020-01-30 DIAGNOSIS — M25.552 PAIN OF LEFT HIP JOINT: ICD-10-CM

## 2020-01-30 DIAGNOSIS — M16.12 ARTHRITIS OF LEFT HIP: ICD-10-CM

## 2020-01-30 PROCEDURE — 20610 DRAIN/INJ JOINT/BURSA W/O US: CPT | Performed by: ANESTHESIOLOGY

## 2020-01-30 PROCEDURE — 77002 NEEDLE LOCALIZATION BY XRAY: CPT

## 2020-01-30 PROCEDURE — 77002 NEEDLE LOCALIZATION BY XRAY: CPT | Performed by: ANESTHESIOLOGY

## 2020-01-30 RX ORDER — LIDOCAINE HYDROCHLORIDE 10 MG/ML
5 INJECTION, SOLUTION EPIDURAL; INFILTRATION; INTRACAUDAL; PERINEURAL ONCE
Status: COMPLETED | OUTPATIENT
Start: 2020-01-30 | End: 2020-01-30

## 2020-01-30 RX ORDER — METHYLPREDNISOLONE ACETATE 80 MG/ML
80 INJECTION, SUSPENSION INTRA-ARTICULAR; INTRALESIONAL; INTRAMUSCULAR; PARENTERAL; SOFT TISSUE ONCE
Status: COMPLETED | OUTPATIENT
Start: 2020-01-30 | End: 2020-01-30

## 2020-01-30 RX ADMIN — METHYLPREDNISOLONE ACETATE 80 MG: 80 INJECTION, SUSPENSION INTRA-ARTICULAR; INTRALESIONAL; INTRAMUSCULAR; SOFT TISSUE at 15:28

## 2020-01-30 RX ADMIN — IOHEXOL 1 ML: 300 INJECTION, SOLUTION INTRAVENOUS at 15:27

## 2020-01-30 RX ADMIN — LIDOCAINE HYDROCHLORIDE 2 ML: 20 INJECTION, SOLUTION EPIDURAL; INFILTRATION; INTRACAUDAL at 15:27

## 2020-01-30 RX ADMIN — LIDOCAINE HYDROCHLORIDE 3 ML: 10 INJECTION, SOLUTION EPIDURAL; INFILTRATION; INTRACAUDAL; PERINEURAL at 15:23

## 2020-01-30 NOTE — H&P
History of Present Illness: The patient is a 44 y o  male who presents with complaints of left hip pain  Patient Active Problem List   Diagnosis    Encounter for annual physical exam    Nocturia    Allergic rhinitis    Mallet finger of left finger(s)    Iliotibial band syndrome of left side    Pain of left hip joint       Past Medical History:   Diagnosis Date    Known health problems: none        No past surgical history on file  Current Outpatient Medications:     ipratropium (ATROVENT) 0 03 % nasal spray, SPRAY 2 SPRAYS 2 TO 4 TIMES A DAY AS NEEDED FOR NASAL CONGESTION, Disp: 30 mL, Rfl: 4    meloxicam (MOBIC) 15 mg tablet, Take 1 tablet (15 mg total) by mouth daily, Disp: 30 tablet, Rfl: 2    montelukast (SINGULAIR) 10 mg tablet, TAKE ONE (1) TABLET(S) DAILY, Disp: 30 tablet, Rfl: 5    VENTOLIN  (90 Base) MCG/ACT inhaler, , Disp: , Rfl:     Current Facility-Administered Medications:     iohexol (OMNIPAQUE) 300 mg/mL injection 50 mL, 50 mL, Intra-articular, Once, Samuel Chauhan DO    lidocaine (PF) (XYLOCAINE-MPF) 1 % injection 5 mL, 5 mL, Other, Once, Samuel Chauhan DO    lidocaine (PF) (XYLOCAINE-MPF) 2 % injection 5 mL, 5 mL, Intra-articular, Once, Samuel Chauhan DO    methylPREDNISolone acetate (DEPO-MEDROL) injection 80 mg, 80 mg, Intra-articular, Once, Samuel Chauhan DO    No Known Allergies    Physical Exam:   General: Awake, Alert, Oriented x 3, Mood and affect appropriate  Respiratory: Respirations even and unlabored  Cardiovascular: Peripheral pulses intact; no edema  Musculoskeletal Exam:  Decreased range motion left hip    ASA Score: II         Assessment:   1  Pain of left hip joint    2   Arthritis of left hip        Plan: Left hip joint injection

## 2020-01-30 NOTE — DISCHARGE INSTRUCTIONS

## 2020-02-06 ENCOUNTER — TELEPHONE (OUTPATIENT)
Dept: PAIN MEDICINE | Facility: CLINIC | Age: 50
End: 2020-02-06

## 2020-02-06 NOTE — TELEPHONE ENCOUNTER
Dr Radha Flannery Procedure F/u (S/p LEFT HIP INJECTION No F/u)       spoke with pt who states that he seems a little bit better and that he really dose not have any pain at this time advised pt that if pain comes back or gets worse to give our office a call       No Follow up  Please obtain this information

## 2020-03-20 DIAGNOSIS — M76.32 ILIOTIBIAL BAND SYNDROME OF LEFT SIDE: ICD-10-CM

## 2020-03-20 RX ORDER — MELOXICAM 15 MG/1
TABLET ORAL
Qty: 30 TABLET | Refills: 2 | Status: SHIPPED | OUTPATIENT
Start: 2020-03-20 | End: 2020-06-17

## 2020-04-14 DIAGNOSIS — J31.0 CHRONIC RHINITIS: ICD-10-CM

## 2020-04-14 RX ORDER — IPRATROPIUM BROMIDE 21 UG/1
2 SPRAY, METERED NASAL 4 TIMES DAILY
Qty: 30 ML | Refills: 4 | Status: SHIPPED | OUTPATIENT
Start: 2020-04-14 | End: 2020-08-30

## 2020-06-17 DIAGNOSIS — M76.32 ILIOTIBIAL BAND SYNDROME OF LEFT SIDE: ICD-10-CM

## 2020-06-17 RX ORDER — MELOXICAM 15 MG/1
TABLET ORAL
Qty: 30 TABLET | Refills: 2 | Status: SHIPPED | OUTPATIENT
Start: 2020-06-17 | End: 2020-09-11

## 2020-07-25 DIAGNOSIS — J45.909 UNCOMPLICATED ASTHMA, UNSPECIFIED ASTHMA SEVERITY, UNSPECIFIED WHETHER PERSISTENT: ICD-10-CM

## 2020-07-27 RX ORDER — MONTELUKAST SODIUM 10 MG/1
TABLET ORAL
Qty: 30 TABLET | Refills: 2 | Status: SHIPPED | OUTPATIENT
Start: 2020-07-27 | End: 2020-10-22

## 2020-08-30 DIAGNOSIS — J31.0 CHRONIC RHINITIS: ICD-10-CM

## 2020-08-30 RX ORDER — IPRATROPIUM BROMIDE 21 UG/1
SPRAY, METERED NASAL
Qty: 30 ML | Refills: 4 | Status: SHIPPED | OUTPATIENT
Start: 2020-08-30 | End: 2021-02-11

## 2020-09-11 DIAGNOSIS — M76.32 ILIOTIBIAL BAND SYNDROME OF LEFT SIDE: ICD-10-CM

## 2020-09-11 RX ORDER — MELOXICAM 15 MG/1
TABLET ORAL
Qty: 30 TABLET | Refills: 2 | Status: SHIPPED | OUTPATIENT
Start: 2020-09-11 | End: 2020-12-16

## 2020-10-21 DIAGNOSIS — J45.909 UNCOMPLICATED ASTHMA, UNSPECIFIED ASTHMA SEVERITY, UNSPECIFIED WHETHER PERSISTENT: ICD-10-CM

## 2020-10-22 RX ORDER — MONTELUKAST SODIUM 10 MG/1
TABLET ORAL
Qty: 30 TABLET | Refills: 2 | Status: SHIPPED | OUTPATIENT
Start: 2020-10-22 | End: 2021-02-05

## 2020-11-10 ENCOUNTER — VBI (OUTPATIENT)
Dept: ADMINISTRATIVE | Facility: OTHER | Age: 50
End: 2020-11-10

## 2020-12-15 DIAGNOSIS — M76.32 ILIOTIBIAL BAND SYNDROME OF LEFT SIDE: ICD-10-CM

## 2020-12-16 RX ORDER — MELOXICAM 15 MG/1
TABLET ORAL
Qty: 30 TABLET | Refills: 2 | Status: SHIPPED | OUTPATIENT
Start: 2020-12-16 | End: 2021-03-12

## 2021-01-28 DIAGNOSIS — Z20.822 EXPOSURE TO COVID-19 VIRUS: Primary | ICD-10-CM

## 2021-02-04 DIAGNOSIS — J45.909 UNCOMPLICATED ASTHMA, UNSPECIFIED ASTHMA SEVERITY, UNSPECIFIED WHETHER PERSISTENT: ICD-10-CM

## 2021-02-05 RX ORDER — MONTELUKAST SODIUM 10 MG/1
TABLET ORAL
Qty: 30 TABLET | Refills: 2 | Status: SHIPPED | OUTPATIENT
Start: 2021-02-05 | End: 2021-05-16

## 2021-02-11 DIAGNOSIS — J31.0 CHRONIC RHINITIS: ICD-10-CM

## 2021-02-11 RX ORDER — IPRATROPIUM BROMIDE 21 UG/1
SPRAY, METERED NASAL
Qty: 30 ML | Refills: 4 | Status: SHIPPED | OUTPATIENT
Start: 2021-02-11 | End: 2021-07-07

## 2021-03-12 DIAGNOSIS — M76.32 ILIOTIBIAL BAND SYNDROME OF LEFT SIDE: ICD-10-CM

## 2021-03-12 RX ORDER — MELOXICAM 15 MG/1
TABLET ORAL
Qty: 30 TABLET | Refills: 2 | Status: SHIPPED | OUTPATIENT
Start: 2021-03-12 | End: 2021-06-10

## 2021-04-23 ENCOUNTER — RA CDI HCC (OUTPATIENT)
Dept: OTHER | Facility: HOSPITAL | Age: 51
End: 2021-04-23

## 2021-04-23 NOTE — PROGRESS NOTES
[facility] 75  coding opportunities          Chart reviewed, no opportunity found: CHART REVIEWED, NO OPPORTUNITY FOUND              Patients insurance company: [facility] [address]  (Medicare Advantage and Commercial)

## 2021-04-30 ENCOUNTER — OFFICE VISIT (OUTPATIENT)
Dept: FAMILY MEDICINE CLINIC | Facility: CLINIC | Age: 51
End: 2021-04-30
Payer: COMMERCIAL

## 2021-04-30 VITALS
HEART RATE: 57 BPM | BODY MASS INDEX: 28.76 KG/M2 | DIASTOLIC BLOOD PRESSURE: 80 MMHG | TEMPERATURE: 98.2 F | OXYGEN SATURATION: 97 % | RESPIRATION RATE: 18 BRPM | WEIGHT: 189.8 LBS | HEIGHT: 68 IN | SYSTOLIC BLOOD PRESSURE: 120 MMHG

## 2021-04-30 DIAGNOSIS — Z01.818 PREOPERATIVE EVALUATION OF A MEDICAL CONDITION TO RULE OUT SURGICAL CONTRAINDICATIONS (TAR REQUIRED): ICD-10-CM

## 2021-04-30 DIAGNOSIS — R35.1 NOCTURIA: Primary | ICD-10-CM

## 2021-04-30 DIAGNOSIS — Z00.00 PERIODIC HEALTH ASSESSMENT, GENERAL SCREENING, ADULT: ICD-10-CM

## 2021-04-30 PROCEDURE — 99213 OFFICE O/P EST LOW 20 MIN: CPT | Performed by: FAMILY MEDICINE

## 2021-04-30 RX ORDER — CHLORHEXIDINE GLUCONATE 4 %
LIQUID (ML) TOPICAL
COMMUNITY
Start: 2021-04-16

## 2021-04-30 NOTE — PROGRESS NOTES
FAMILY PRACTICE OFFICE VISIT       NAME: Luciana Cruz  AGE: 44 y o  SEX: male       : 1970        MRN: [id number]    DATE: 2021  TIME: 8:31 AM    Assessment and Plan     Problem List Items Addressed This Visit        Other    Preoperative evaluation of a medical condition to rule out surgical contraindications (TAR required)     Preoperative consultation  Overall the patient appears to be in stable health  We will review results of blood test an EKG  If testing is within normal limits he is medically cleared for upcoming surgery as described         Nocturia - Primary    Relevant Orders    PSA, Total Screen              Chief Complaint     Chief Complaint   Patient presents with    Annual Exam     phq bmi due     Pre-op Exam     pt states he is getting ekg tonight it is already ordered        History of Present Illness     Patient in the office for preoperative consultation  He is scheduled to have left total hip replacement surgery on May 14, 2021 to be performed by [facility]  He is scheduled to have pre-admission testing later today with blood work and EKG  He denies any recent illness  He did have his initial COVID vaccination  Denies any changes in weight  His colonoscopy is up-to-date from 2019  Review of Systems   Review of Systems   Constitutional: Negative  HENT: Negative  Eyes: Negative  Respiratory: Negative  Cardiovascular: Negative  Gastrointestinal: Negative  Genitourinary: Negative  Musculoskeletal: Positive for arthralgias and gait problem  Skin: Negative  Psychiatric/Behavioral: Negative          Active Problem List     Patient Active Problem List   Diagnosis    Preoperative evaluation of a medical condition to rule out surgical contraindications (TAR required)    Nocturia    Allergic rhinitis    Mallet finger of left finger(s)    Iliotibial band syndrome of left side    Pain of left hip joint    Arthritis of left hip       Past Medical History:  Past Medical History:   Diagnosis Date    Known health problems: none        Past Surgical History:  History reviewed  No pertinent surgical history      Family History:  Family History   Problem Relation Age of Onset   Keith Hyperlipidemia Mother     Diabetes Father     COPD Father     Hyperlipidemia Father     Hypertension Father        Social History:  Social History     Socioeconomic History    Marital status: Single     Spouse name: Not on file    Number of children: Not on file    Years of education: Not on file    Highest education level: Not on file   Occupational History    Not on file   Social Needs    Financial resource strain: Not on file    Food insecurity     Worry: Not on file     Inability: Not on file    Transportation needs     Medical: Not on file     Non-medical: Not on file   Tobacco Use    Smoking status: Never Smoker    Smokeless tobacco: Never Used   Substance and Sexual Activity    Alcohol use: Yes     Comment: rare    Drug use: No    Sexual activity: Not on file   Lifestyle    Physical activity     Days per week: Not on file     Minutes per session: Not on file    Stress: Not on file   Relationships    Social connections     Talks on phone: Not on file     Gets together: Not on file     Attends L[employer] service: Not on file     Active member of club or organization: Not on file     Attends meetings of clubs or organizations: Not on file     Relationship status: Not on file    Intimate partner violence     Fear of current or ex partner: Not on file     Emotionally abused: Not on file     Physically abused: Not on file     Forced sexual activity: Not on file   Other Topics Concern    Not on file   Social History Narrative    Not on file       Objective     Vitals:    04/30/21 0826   BP: 120/80   Pulse:    Resp:    Temp:    SpO2:      Wt Readings from Last 3 Encounters:   04/30/21 86 1 kg (189 lb 12 8 oz)   01/21/20 84 4 kg (186 lb)   01/03/20 80 7 kg (178 lb)       Physical Exam  Vitals signs and nursing note reviewed  Constitutional:       General: He is not in acute distress  Appearance: Normal appearance  He is well-developed  He is not ill-appearing  HENT:      Head: Normocephalic and atraumatic  Eyes:      General:         Right eye: No discharge  Left eye: No discharge  Extraocular Movements: Extraocular movements intact  Conjunctiva/sclera: Conjunctivae normal       Pupils: Pupils are equal, round, and reactive to light  Neck:      Musculoskeletal: Normal range of motion and neck supple  Thyroid: No thyromegaly  Cardiovascular:      Rate and Rhythm: Normal rate and regular rhythm  Heart sounds: Normal heart sounds  No murmur  Pulmonary:      Effort: Pulmonary effort is normal  No respiratory distress  Breath sounds: Normal breath sounds  No wheezing, rhonchi or rales  Abdominal:      General: Bowel sounds are normal       Palpations: Abdomen is soft  Tenderness: There is no abdominal tenderness  There is no guarding or rebound  Comments: NO hepatospenomegaly   Musculoskeletal: Normal range of motion  Right lower leg: No edema  Left lower leg: No edema  Lymphadenopathy:      Cervical: No cervical adenopathy  Skin:     Comments: NO RASHES   Neurological:      General: No focal deficit present  Mental Status: He is alert and oriented to person, place, and time  Mental status is at baseline  Cranial Nerves: No cranial nerve deficit  Psychiatric:         Mood and Affect: Mood normal          Behavior: Behavior normal          Thought Content:  Thought content normal          Judgment: Judgment normal          Pertinent Laboratory/Diagnostic Studies:  Lab Results   Component Value Date    BUN 25 01/11/2020    CREATININE 1 25 01/11/2020    CALCIUM 9 4 01/11/2020     08/03/2017    K 4 5 01/11/2020    CO2 29 01/11/2020     01/11/2020 Lab Results   Component Value Date    ALT 21 01/11/2020    AST 25 01/11/2020    ALKPHOS 71 01/11/2020    BILITOT 0 7 08/03/2017       Lab Results   Component Value Date    WBC 5 2 01/11/2020    HGB 15 2 01/11/2020    HCT 45 2 01/11/2020    MCV 91 5 01/11/2020     01/11/2020       Lab Results   Component Value Date    TSH 3 65 01/11/2020       Lab Results   Component Value Date    CHOL 186 08/03/2017     Lab Results   Component Value Date    TRIG 65 01/11/2020     Lab Results   Component Value Date    HDL 48 01/11/2020     Lab Results   Component Value Date    LDLCALC 108 (H) 01/11/2020     No results found for: HGBA1C    Results for orders placed or performed in visit on 01/11/20   Lipid panel   Result Value Ref Range    Total Cholesterol 171 <200 mg/dL    HDL 48 >40 mg/dL    Triglycerides 65 <150 mg/dL    LDL Calculated 108 (H) mg/dL (calc)    Chol HDLC Ratio 3 6 <5 0 (calc)    Non-HDL Cholesterol 123 <130 mg/dL (calc)   Comprehensive metabolic panel   Result Value Ref Range    Glucose, Random 83 65 - 99 mg/dL    BUN 25 7 - 25 mg/dL    Creatinine 1 25 0 70 - 1 33 mg/dL    eGFR Non  66 > OR = 60 mL/min/1 73m2    eGFR  77 > OR = 60 mL/min/1 73m2    SL AMB BUN/CREATININE RATIO NOT APPLICABLE 6 - 22 (calc)    Sodium 139 135 - 146 mmol/L    Potassium 4 5 3 5 - 5 3 mmol/L    Chloride 104 98 - 110 mmol/L    CO2 29 20 - 32 mmol/L    Calcium 9 4 8 6 - 10 3 mg/dL    Protein, Total 6 7 6 1 - 8 1 g/dL    Albumin 4 2 3 6 - 5 1 g/dL    Globulin 2 5 1 9 - 3 7 g/dL (calc)    Albumin/Globulin Ratio 1 7 1 0 - 2 5 (calc)    TOTAL BILIRUBIN 1 0 0 2 - 1 2 mg/dL    Alkaline Phosphatase 71 40 - 115 U/L    AST 25 10 - 35 U/L    ALT 21 9 - 46 U/L   CBC and differential   Result Value Ref Range    White Blood Cell Count 5 2 3 8 - 10 8 Thousand/uL    Red Blood Cell Count 4 94 4 20 - 5 80 Million/uL    Hemoglobin 15 2 13 2 - 17 1 g/dL    HCT 45 2 38 5 - 50 0 %    MCV 91 5 80 0 - 100 0 fL    MCH 30 8 27 0 - 33 0 pg    MCHC 33 6 32 0 - 36 0 g/dL    RDW 12 0 11 0 - 15 0 %    Platelet Count 600 378 - 400 Thousand/uL    SL AMB MPV 10 6 7 5 - 12 5 fL    Neutrophils (Absolute) 3,208 1,500 - 7,800 cells/uL    Lymphocytes (Absolute) 1,451 850 - 3,900 cells/uL    Monocytes (Absolute) 432 200 - 950 cells/uL    Eosinophils (Absolute) 68 15 - 500 cells/uL    Basophils ABS 42 0 - 200 cells/uL    Neutrophils 61 7 %    Lymphocytes 27 9 %    Monocytes 8 3 %    Eosinophils 1 3 %    Basophils PCT 0 8 %   TSH, 3rd generation   Result Value Ref Range    TSH 3 65 0 40 - 4 50 mIU/L   PSA, Total Screen   Result Value Ref Range    Prostate Specific Antigen Total 0 3 < OR = 4 0 ng/mL       Orders Placed This Encounter   Procedures    Lipid panel    TSH, 3rd generation    PSA, Total Screen       ALLERGIES:  No Known Allergies    Current Medications     Current Outpatient Medications   Medication Sig Dispense Refill    CVS Antiseptic Skin Cleanser 4 % topical solutio       ipratropium (ATROVENT) 0 03 % nasal spray USE 2 SPRAYS INTO EACH NOSTRIL 4 (FOUR) TIMES A DAY 30 mL 4    meloxicam (MOBIC) 15 mg tablet TAKE 1 TABLET BY MOUTH EVERY DAY 30 tablet 2    montelukast (SINGULAIR) 10 mg tablet TAKE ONE TABLET BY MOUTH EVERY DAY 30 tablet 2    mupirocin (BACTROBAN) 2 % ointment       VENTOLIN  (90 Base) MCG/ACT inhaler        No current facility-administered medications for this visit            Health Maintenance     Health Maintenance   Topic Date Due    OT PLAN OF CARE  Never done    Pneumococcal Vaccine: Pediatrics (0 to 5 Years) and At-Risk Patients (6 to 59 Years) (1 of 1 - PPSV23) Never done    HIV Screening  Never done    COVID-19 Vaccine (1) Never done    BMI: Followup Plan  Never done    DTaP,Tdap,and Td Vaccines (1 - Tdap) Never done    PT PLAN OF CARE  11/27/2019    Annual Physical  12/27/2020    Influenza Vaccine (1) 06/30/2021 (Originally 9/1/2020)    Depression Screening PHQ  04/30/2022    BMI: Adult 04/30/2022    Colorectal Cancer Screening  02/25/2029    HIB Vaccine  Aged Out    Hepatitis B Vaccine  Aged Out    IPV Vaccine  Aged Out    Hepatitis A Vaccine  Aged Out    Meningococcal ACWY Vaccine  Aged Out    HPV Vaccine  Aged Out     There is no immunization history for the selected administration types on file for this patient      Sravanthi Reed MD

## 2021-04-30 NOTE — ASSESSMENT & PLAN NOTE
Preoperative consultation  Overall the patient appears to be in stable health  We will review results of blood test an EKG    If testing is within normal limits he is medically cleared for upcoming surgery as described

## 2021-05-07 ENCOUNTER — TELEPHONE (OUTPATIENT)
Dept: FAMILY MEDICINE CLINIC | Facility: CLINIC | Age: 51
End: 2021-05-07

## 2021-05-07 LAB
CHOLEST SERPL-MCNC: 186 MG/DL
CHOLEST/HDLC SERPL: 3.6 (CALC)
HDLC SERPL-MCNC: 51 MG/DL
LDLC SERPL CALC-MCNC: 119 MG/DL (CALC)
NONHDLC SERPL-MCNC: 135 MG/DL (CALC)
PSA SERPL-MCNC: 0.3 NG/ML
TRIGL SERPL-MCNC: 65 MG/DL
TSH SERPL-ACNC: 3.55 MIU/L (ref 0.4–4.5)

## 2021-05-07 NOTE — TELEPHONE ENCOUNTER
Called pt- Recording states- Heladio can not be completed at this time  Called pt's wife, SUELLEN w/ results

## 2021-05-07 NOTE — TELEPHONE ENCOUNTER
----- Message from Zbigniew Moreno MD sent at 7/8/7726  6:33 AM EDT -----  All recent blood work stable for patient

## 2021-05-16 DIAGNOSIS — J45.909 UNCOMPLICATED ASTHMA, UNSPECIFIED ASTHMA SEVERITY, UNSPECIFIED WHETHER PERSISTENT: ICD-10-CM

## 2021-05-16 RX ORDER — MONTELUKAST SODIUM 10 MG/1
TABLET ORAL
Qty: 30 TABLET | Refills: 5 | Status: SHIPPED | OUTPATIENT
Start: 2021-05-16 | End: 2021-12-06

## 2021-06-10 DIAGNOSIS — M76.32 ILIOTIBIAL BAND SYNDROME OF LEFT SIDE: ICD-10-CM

## 2021-06-10 RX ORDER — MELOXICAM 15 MG/1
TABLET ORAL
Qty: 30 TABLET | Refills: 2 | Status: SHIPPED | OUTPATIENT
Start: 2021-06-10 | End: 2022-05-13 | Stop reason: ALTCHOICE

## 2021-11-30 DIAGNOSIS — J31.0 CHRONIC RHINITIS: ICD-10-CM

## 2021-11-30 RX ORDER — IPRATROPIUM BROMIDE 21 UG/1
SPRAY, METERED NASAL
Qty: 30 ML | Refills: 4 | Status: SHIPPED | OUTPATIENT
Start: 2021-11-30 | End: 2022-03-01

## 2021-12-05 DIAGNOSIS — J45.909 UNCOMPLICATED ASTHMA, UNSPECIFIED ASTHMA SEVERITY, UNSPECIFIED WHETHER PERSISTENT: ICD-10-CM

## 2021-12-06 RX ORDER — MONTELUKAST SODIUM 10 MG/1
TABLET ORAL
Qty: 30 TABLET | Refills: 5 | Status: SHIPPED | OUTPATIENT
Start: 2021-12-06 | End: 2022-05-03 | Stop reason: SDUPTHER

## 2022-02-28 DIAGNOSIS — J31.0 CHRONIC RHINITIS: ICD-10-CM

## 2022-03-01 RX ORDER — IPRATROPIUM BROMIDE 21 UG/1
SPRAY, METERED NASAL
Qty: 30 ML | Refills: 4 | Status: SHIPPED | OUTPATIENT
Start: 2022-03-01 | End: 2022-05-09 | Stop reason: SDUPTHER

## 2022-05-03 DIAGNOSIS — J45.909 UNCOMPLICATED ASTHMA, UNSPECIFIED ASTHMA SEVERITY, UNSPECIFIED WHETHER PERSISTENT: ICD-10-CM

## 2022-05-04 RX ORDER — MONTELUKAST SODIUM 10 MG/1
10 TABLET ORAL DAILY
Qty: 90 TABLET | Refills: 0 | Status: SHIPPED | OUTPATIENT
Start: 2022-05-04 | End: 2022-07-21

## 2022-05-06 ENCOUNTER — RA CDI HCC (OUTPATIENT)
Dept: OTHER | Facility: HOSPITAL | Age: 52
End: 2022-05-06

## 2022-05-06 NOTE — PROGRESS NOTES
[facility]  coding opportunities       Chart reviewed, no opportunity found: CHART REVIEWED, NO OPPORTUNITY FOUND        Patients Insurance        Commercial Insurance: [employer]

## 2022-05-09 DIAGNOSIS — J31.0 CHRONIC RHINITIS: ICD-10-CM

## 2022-05-09 RX ORDER — ACETAMINOPHEN 500 MG
500 TABLET ORAL EVERY 6 HOURS PRN
COMMUNITY

## 2022-05-10 RX ORDER — IPRATROPIUM BROMIDE 21 UG/1
2 SPRAY, METERED NASAL 4 TIMES DAILY
Qty: 30 ML | Refills: 4 | Status: SHIPPED | OUTPATIENT
Start: 2022-05-10 | End: 2022-06-27 | Stop reason: SDUPTHER

## 2022-05-13 ENCOUNTER — OFFICE VISIT (OUTPATIENT)
Dept: FAMILY MEDICINE CLINIC | Facility: CLINIC | Age: 52
End: 2022-05-13
Payer: COMMERCIAL

## 2022-05-13 VITALS
WEIGHT: 192 LBS | TEMPERATURE: 97.6 F | DIASTOLIC BLOOD PRESSURE: 78 MMHG | OXYGEN SATURATION: 98 % | SYSTOLIC BLOOD PRESSURE: 122 MMHG | RESPIRATION RATE: 16 BRPM | HEART RATE: 57 BPM | HEIGHT: 68 IN | BODY MASS INDEX: 29.1 KG/M2

## 2022-05-13 DIAGNOSIS — Z00.00 PERIODIC HEALTH ASSESSMENT, GENERAL SCREENING, ADULT: Primary | ICD-10-CM

## 2022-05-13 DIAGNOSIS — G47.00 INSOMNIA, UNSPECIFIED TYPE: ICD-10-CM

## 2022-05-13 DIAGNOSIS — R35.1 NOCTURIA: ICD-10-CM

## 2022-05-13 PROCEDURE — 99396 PREV VISIT EST AGE 40-64: CPT | Performed by: FAMILY MEDICINE

## 2022-05-13 PROCEDURE — 1036F TOBACCO NON-USER: CPT | Performed by: FAMILY MEDICINE

## 2022-05-13 PROCEDURE — 3725F SCREEN DEPRESSION PERFORMED: CPT | Performed by: FAMILY MEDICINE

## 2022-05-13 PROCEDURE — 3008F BODY MASS INDEX DOCD: CPT | Performed by: FAMILY MEDICINE

## 2022-05-13 RX ORDER — TRAZODONE HYDROCHLORIDE 50 MG/1
50 TABLET ORAL
Qty: 30 TABLET | Refills: 3 | Status: SHIPPED | OUTPATIENT
Start: 2022-05-13 | End: 2022-06-08

## 2022-05-13 NOTE — PROGRESS NOTES
FAMILY PRACTICE OFFICE VISIT       NAME: Jannette Ferrell  AGE: 46 y o  SEX: male       : 1970        MRN: [id number]    DATE: 2022  TIME: 5:18 PM    Assessment and Plan     Problem List Items Addressed This Visit        Other    Nocturia    Relevant Orders    PSA, Total Screen    Periodic health assessment, general screening, adult - Primary     Well adult  Overall the patient appears to be in stable health and he will obtain blood work as ordered for further evaluation  We will make further recommendations pending results of test   His colonoscopy is up-to-date           Relevant Orders    CBC    Comprehensive metabolic panel    Lipid panel    TSH, 3rd generation    PSA, Total Screen    Insomnia     Insomnia  Patient given prescription for trial of trazodone to use 50 mg Q HS  Patient will call if symptoms have not improved within the 1st 2 weeks and we will titrate medication accordingly           Relevant Medications    traZODone (DESYREL) 50 mg tablet              Chief Complaint     Chief Complaint   Patient presents with    Physical Exam     Annual well exam       History of Present Illness     Patient the office for annual wellness exam   He denies any recent illness  He does try to exercise on a regular basis  His colonoscopy is up-to-date from   Patient does report mild cognitive deficits in short-term memory  Symptoms have not had a significant impact on his home or work life  Patient does have family history of dementia in his father  He also reports having difficulties sleeping at night  He often will fall asleep and awakened in the early hours unable to fall asleep once again  This happens on a regular basis  He does experience some occasional daytime fatigue  Review of Systems   Review of Systems   Constitutional: Negative  HENT: Negative  Eyes: Negative  Respiratory: Negative  Cardiovascular: Negative  Gastrointestinal: Negative  Genitourinary: Negative  Musculoskeletal: Negative  Skin: Negative  Neurological: Negative  As per HPI   Psychiatric/Behavioral: Positive for sleep disturbance         Active Problem List     Patient Active Problem List   Diagnosis    Preoperative evaluation of a medical condition to rule out surgical contraindications (TAR required)    Nocturia    Allergic rhinitis    Mallet finger of left finger(s)    Iliotibial band syndrome of left side    Pain of left hip joint    Arthritis of left hip    Periodic health assessment, general screening, adult    Insomnia       Past Medical History:  Past Medical History:   Diagnosis Date    Known health problems: none        Past Surgical History:  Past Surgical History:   Procedure Laterality Date    HIP SURGERY         Family History:  Family History   Problem Relation Age of Onset    Hyperlipidemia Mother     Diabetes Father     COPD Father     Hyperlipidemia Father     Hypertension Father        Social History:  Social History     Socioeconomic History    Marital status: Single     Spouse name: Not on file    Number of children: Not on file    Years of education: Not on file    Highest education level: Not on file   Occupational History    Not on file   Tobacco Use    Smoking status: Never Smoker    Smokeless tobacco: Never Used   Vaping Use    Vaping Use: Never used   Substance and Sexual Activity    Alcohol use: Yes     Comment: rare    Drug use: No    Sexual activity: Yes   Other Topics Concern    Not on file   Social History Narrative    Not on file     Social Determinants of Health     Financial Resource Strain: Not on file   Food Insecurity: Not on file   Transportation Needs: Not on file   Physical Activity: Not on file   Stress: Not on file   Social Connections: Not on file   Intimate Partner Violence: Not on file   Housing Stability: Not on file       Objective     Vitals:    05/13/22 0841   BP: 122/78   Pulse: 57 Resp: 16   Temp: 97 6 °F (36 4 °C)   SpO2: 98%     Wt Readings from Last 3 Encounters:   05/13/22 87 1 kg (192 lb)   04/30/21 86 1 kg (189 lb 12 8 oz)   01/21/20 84 4 kg (186 lb)       Physical Exam  Constitutional:       General: He is not in acute distress  Appearance: Normal appearance  He is not ill-appearing  HENT:      Head: Normocephalic and atraumatic  Eyes:      General:         Right eye: No discharge  Left eye: No discharge  Extraocular Movements: Extraocular movements intact  Conjunctiva/sclera: Conjunctivae normal       Pupils: Pupils are equal, round, and reactive to light  Neck:      Vascular: No carotid bruit  Cardiovascular:      Rate and Rhythm: Normal rate and regular rhythm  Heart sounds: Normal heart sounds  No murmur heard  Pulmonary:      Effort: Pulmonary effort is normal       Breath sounds: Normal breath sounds  No wheezing, rhonchi or rales  Abdominal:      General: Abdomen is flat  Bowel sounds are normal  There is no distension  Palpations: Abdomen is soft  Tenderness: There is no abdominal tenderness  There is no guarding or rebound  Musculoskeletal:      Right lower leg: No edema  Left lower leg: No edema  Lymphadenopathy:      Cervical: No cervical adenopathy  Skin:     Findings: No rash  Neurological:      General: No focal deficit present  Mental Status: He is alert and oriented to person, place, and time  Cranial Nerves: No cranial nerve deficit  Psychiatric:         Mood and Affect: Mood normal          Behavior: Behavior normal          Thought Content:  Thought content normal          Judgment: Judgment normal          Pertinent Laboratory/Diagnostic Studies:  Lab Results   Component Value Date    BUN 25 01/11/2020    CREATININE 1 25 01/11/2020    CALCIUM 9 4 01/11/2020     08/03/2017    K 4 5 01/11/2020    CO2 29 01/11/2020     01/11/2020     Lab Results   Component Value Date    ALT 21 01/11/2020    AST 25 01/11/2020    ALKPHOS 71 01/11/2020    BILITOT 0 7 08/03/2017       Lab Results   Component Value Date    WBC 5 2 01/11/2020    HGB 15 2 01/11/2020    HCT 45 2 01/11/2020    MCV 91 5 01/11/2020     01/11/2020       Lab Results   Component Value Date    TSH 3 55 05/06/2021       Lab Results   Component Value Date    CHOL 186 08/03/2017     Lab Results   Component Value Date    TRIG 65 05/06/2021     Lab Results   Component Value Date    HDL 51 05/06/2021     Lab Results   Component Value Date    LDLCALC 119 (H) 05/06/2021     No results found for: HGBA1C    Results for orders placed or performed in visit on 05/06/21   Lipid panel   Result Value Ref Range    Total Cholesterol 186 <200 mg/dL    HDL 51 > OR = 40 mg/dL    Triglycerides 65 <150 mg/dL    LDL Calculated 119 (H) mg/dL (calc)    Chol HDLC Ratio 3 6 <5 0 (calc)    Non-HDL Cholesterol 135 (H) <130 mg/dL (calc)   TSH, 3rd generation   Result Value Ref Range    TSH 3 55 0 40 - 4 50 mIU/L   PSA, Total Screen   Result Value Ref Range    Prostate Specific Antigen Total 0 3 < OR = 4 0 ng/mL       Orders Placed This Encounter   Procedures    CBC    Comprehensive metabolic panel    Lipid panel    TSH, 3rd generation    PSA, Total Screen       ALLERGIES:  No Known Allergies    Current Medications     Current Outpatient Medications   Medication Sig Dispense Refill    ipratropium (ATROVENT) 0 03 % nasal spray 2 sprays into each nostril 4 (four) times a day 30 mL 4    montelukast (SINGULAIR) 10 mg tablet Take 1 tablet (10 mg total) by mouth daily 90 tablet 0    traZODone (DESYREL) 50 mg tablet Take 1 tablet (50 mg total) by mouth daily at bedtime 30 tablet 3    acetaminophen (TYLENOL) 500 mg tablet Take 500 mg by mouth every 6 (six) hours as needed (Patient not taking: Reported on 5/13/2022)      CVS Antiseptic Skin Cleanser 4 % topical solutio  (Patient not taking: Reported on 5/13/2022)      mupirocin (BACTROBAN) 2 % ointment (Patient not taking: Reported on 5/13/2022)      VENTOLIN  (90 Base) MCG/ACT inhaler  (Patient not taking: Reported on 5/13/2022)       No current facility-administered medications for this visit           Health Maintenance     Health Maintenance   Topic Date Due    Hepatitis C Screening  Never done    OT PLAN OF CARE  Never done    Pneumococcal Vaccine: Pediatrics (0 to 5 Years) and At-Risk Patients (6 to 59 Years) (1 - PCV) Never done    HIV Screening  Never done    BMI: Followup Plan  Never done    DTaP,Tdap,and Td Vaccines (1 - Tdap) Never done    PT PLAN OF CARE  11/27/2019    Annual Physical  12/27/2020    COVID-19 Vaccine (3 - Booster for Yfn Lilia series) 08/13/2022 (Originally 10/20/2021)    Influenza Vaccine (Season Ended) 09/01/2022    Depression Screening  05/13/2023    BMI: Adult  05/13/2023    Colorectal Cancer Screening  02/25/2029    HIB Vaccine  Aged Out    Hepatitis B Vaccine  Aged Out    IPV Vaccine  Aged Out    Hepatitis A Vaccine  Aged Out    Meningococcal ACWY Vaccine  Aged Out    HPV Vaccine  Aged Out     Immunization History   Administered Date(s) Administered    COVID-19 MODERNA VACC 0 5 ML IM 04/23/2021, 63/03/5998       Rubio Mcneal MD

## 2022-05-13 NOTE — ASSESSMENT & PLAN NOTE
Well adult  Overall the patient appears to be in stable health and he will obtain blood work as ordered for further evaluation    We will make further recommendations pending results of test   His colonoscopy is up-to-date

## 2022-05-13 NOTE — ASSESSMENT & PLAN NOTE
Insomnia  Patient given prescription for trial of trazodone to use 50 mg Q HS    Patient will call if symptoms have not improved within the 1st 2 weeks and we will titrate medication accordingly

## 2022-05-19 LAB
ALBUMIN SERPL-MCNC: 4 G/DL (ref 3.6–5.1)
ALBUMIN/GLOB SERPL: 1.6 (CALC) (ref 1–2.5)
ALP SERPL-CCNC: 61 U/L (ref 35–144)
ALT SERPL-CCNC: 39 U/L (ref 9–46)
AST SERPL-CCNC: 24 U/L (ref 10–35)
BILIRUB SERPL-MCNC: 0.5 MG/DL (ref 0.2–1.2)
BUN SERPL-MCNC: 19 MG/DL (ref 7–25)
BUN/CREAT SERPL: NORMAL (CALC) (ref 6–22)
CALCIUM SERPL-MCNC: 9.3 MG/DL (ref 8.6–10.3)
CHLORIDE SERPL-SCNC: 106 MMOL/L (ref 98–110)
CHOLEST SERPL-MCNC: 155 MG/DL
CHOLEST/HDLC SERPL: 4.1 (CALC)
CO2 SERPL-SCNC: 30 MMOL/L (ref 20–32)
CREAT SERPL-MCNC: 1.15 MG/DL (ref 0.7–1.33)
ERYTHROCYTE [DISTWIDTH] IN BLOOD BY AUTOMATED COUNT: 12.1 % (ref 11–15)
GLOBULIN SER CALC-MCNC: 2.5 G/DL (CALC) (ref 1.9–3.7)
GLUCOSE SERPL-MCNC: 97 MG/DL (ref 65–99)
HCT VFR BLD AUTO: 44.5 % (ref 38.5–50)
HDLC SERPL-MCNC: 38 MG/DL
HGB BLD-MCNC: 14.8 G/DL (ref 13.2–17.1)
LDLC SERPL CALC-MCNC: 100 MG/DL (CALC)
MCH RBC QN AUTO: 29.9 PG (ref 27–33)
MCHC RBC AUTO-ENTMCNC: 33.3 G/DL (ref 32–36)
MCV RBC AUTO: 89.9 FL (ref 80–100)
NONHDLC SERPL-MCNC: 117 MG/DL (CALC)
PLATELET # BLD AUTO: 225 THOUSAND/UL (ref 140–400)
PMV BLD REES-ECKER: 9.8 FL (ref 7.5–12.5)
POTASSIUM SERPL-SCNC: 4.5 MMOL/L (ref 3.5–5.3)
PROT SERPL-MCNC: 6.5 G/DL (ref 6.1–8.1)
PSA SERPL-MCNC: 0.2 NG/ML
RBC # BLD AUTO: 4.95 MILLION/UL (ref 4.2–5.8)
SL AMB EGFR AFRICAN AMERICAN: 84 ML/MIN/1.73M2
SL AMB EGFR NON AFRICAN AMERICAN: 72 ML/MIN/1.73M2
SODIUM SERPL-SCNC: 141 MMOL/L (ref 135–146)
TRIGL SERPL-MCNC: 78 MG/DL
TSH SERPL-ACNC: 2.9 MIU/L (ref 0.4–4.5)
WBC # BLD AUTO: 4.7 THOUSAND/UL (ref 3.8–10.8)

## 2022-05-20 ENCOUNTER — TELEPHONE (OUTPATIENT)
Dept: FAMILY MEDICINE CLINIC | Facility: CLINIC | Age: 52
End: 2022-05-20

## 2022-05-20 NOTE — TELEPHONE ENCOUNTER
----- Message from Dionna Leon MD sent at 5/29/4586  6:14 AM EDT -----  All recent blood work stable for patient

## 2022-06-08 DIAGNOSIS — G47.00 INSOMNIA, UNSPECIFIED TYPE: ICD-10-CM

## 2022-06-08 RX ORDER — TRAZODONE HYDROCHLORIDE 50 MG/1
TABLET ORAL
Qty: 90 TABLET | Refills: 2 | Status: SHIPPED | OUTPATIENT
Start: 2022-06-08 | End: 2022-07-01 | Stop reason: SDUPTHER

## 2022-06-27 DIAGNOSIS — J31.0 CHRONIC RHINITIS: ICD-10-CM

## 2022-06-27 RX ORDER — IPRATROPIUM BROMIDE 21 UG/1
2 SPRAY, METERED NASAL 4 TIMES DAILY
Qty: 90 ML | Refills: 3 | Status: SHIPPED | OUTPATIENT
Start: 2022-06-27 | End: 2023-02-20

## 2022-07-01 DIAGNOSIS — G47.00 INSOMNIA, UNSPECIFIED TYPE: ICD-10-CM

## 2022-07-01 RX ORDER — TRAZODONE HYDROCHLORIDE 50 MG/1
50 TABLET ORAL
Qty: 90 TABLET | Refills: 2 | Status: SHIPPED | OUTPATIENT
Start: 2022-07-01 | End: 2022-09-13 | Stop reason: SDUPTHER

## 2022-07-21 DIAGNOSIS — J45.909 UNCOMPLICATED ASTHMA, UNSPECIFIED ASTHMA SEVERITY, UNSPECIFIED WHETHER PERSISTENT: ICD-10-CM

## 2022-07-21 RX ORDER — MONTELUKAST SODIUM 10 MG/1
TABLET ORAL
Qty: 90 TABLET | Refills: 3 | Status: SHIPPED | OUTPATIENT
Start: 2022-07-21

## 2022-07-27 ENCOUNTER — TELEPHONE (OUTPATIENT)
Dept: PSYCHIATRY | Facility: CLINIC | Age: 52
End: 2022-07-27

## 2022-09-13 DIAGNOSIS — G47.00 INSOMNIA, UNSPECIFIED TYPE: ICD-10-CM

## 2022-09-13 RX ORDER — TRAZODONE HYDROCHLORIDE 50 MG/1
TABLET ORAL
Qty: 135 TABLET | Refills: 1 | Status: SHIPPED | OUTPATIENT
Start: 2022-09-13 | End: 2023-03-20

## 2022-10-11 PROBLEM — Z00.00 PERIODIC HEALTH ASSESSMENT, GENERAL SCREENING, ADULT: Status: RESOLVED | Noted: 2022-05-13 | Resolved: 2022-10-11

## 2022-11-10 ENCOUNTER — VBI (OUTPATIENT)
Dept: ADMINISTRATIVE | Facility: OTHER | Age: 52
End: 2022-11-10

## 2023-02-20 DIAGNOSIS — J31.0 CHRONIC RHINITIS: ICD-10-CM

## 2023-02-20 RX ORDER — IPRATROPIUM BROMIDE 21 UG/1
SPRAY, METERED NASAL
Qty: 90 ML | Refills: 4 | Status: SHIPPED | OUTPATIENT
Start: 2023-02-20

## 2023-03-20 DIAGNOSIS — G47.00 INSOMNIA, UNSPECIFIED TYPE: ICD-10-CM

## 2023-03-20 RX ORDER — TRAZODONE HYDROCHLORIDE 50 MG/1
TABLET ORAL
Qty: 135 TABLET | Refills: 3 | Status: SHIPPED | OUTPATIENT
Start: 2023-03-20